# Patient Record
Sex: MALE | Race: WHITE | NOT HISPANIC OR LATINO | Employment: OTHER | URBAN - NONMETROPOLITAN AREA
[De-identification: names, ages, dates, MRNs, and addresses within clinical notes are randomized per-mention and may not be internally consistent; named-entity substitution may affect disease eponyms.]

---

## 2019-09-06 ENCOUNTER — HOSPITAL ENCOUNTER (EMERGENCY)
Facility: HOSPITAL | Age: 49
Discharge: HOME | End: 2019-09-06
Attending: EMERGENCY MEDICINE | Admitting: EMERGENCY MEDICINE

## 2019-09-06 ENCOUNTER — APPOINTMENT (OUTPATIENT)
Dept: CT IMAGING | Facility: HOSPITAL | Age: 49
End: 2019-09-06

## 2019-09-06 VITALS
TEMPERATURE: 98.1 F | OXYGEN SATURATION: 99 % | DIASTOLIC BLOOD PRESSURE: 73 MMHG | HEIGHT: 70 IN | SYSTOLIC BLOOD PRESSURE: 147 MMHG | HEART RATE: 55 BPM | WEIGHT: 175 LBS | RESPIRATION RATE: 18 BRPM | BODY MASS INDEX: 25.05 KG/M2

## 2019-09-06 DIAGNOSIS — S16.1XXA STRAIN OF NECK MUSCLE, INITIAL ENCOUNTER: Primary | ICD-10-CM

## 2019-09-06 PROCEDURE — 99283 EMERGENCY DEPT VISIT LOW MDM: CPT

## 2019-09-06 PROCEDURE — 72125 CT NECK SPINE W/O DYE: CPT | Performed by: RADIOLOGY

## 2019-09-06 PROCEDURE — 72125 CT NECK SPINE W/O DYE: CPT

## 2019-09-06 RX ORDER — CYCLOBENZAPRINE HCL 10 MG
10 TABLET ORAL 3 TIMES DAILY PRN
Qty: 12 TABLET | Refills: 0 | Status: ON HOLD | OUTPATIENT
Start: 2019-09-06 | End: 2023-02-12
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
## 2021-03-25 ENCOUNTER — HOSPITAL ENCOUNTER (EMERGENCY)
Facility: HOSPITAL | Age: 51
Discharge: LEFT BEFORE DISCHARGE | End: 2021-03-25

## 2021-03-25 VITALS
OXYGEN SATURATION: 96 % | TEMPERATURE: 98.7 F | RESPIRATION RATE: 18 BRPM | BODY MASS INDEX: 24.5 KG/M2 | HEIGHT: 71 IN | WEIGHT: 175 LBS | DIASTOLIC BLOOD PRESSURE: 73 MMHG | HEART RATE: 67 BPM | SYSTOLIC BLOOD PRESSURE: 126 MMHG

## 2021-03-25 PROCEDURE — 99211 OFF/OP EST MAY X REQ PHY/QHP: CPT

## 2021-03-25 NOTE — ED NOTES
"Pt came to triage desk at this time stating, \"I am feeling better and I am just going to go home\".  Pt ambulated out of ED with no assistance.     Ya Rodríguez RN  03/25/21 0448    "

## 2023-02-12 ENCOUNTER — HOSPITAL ENCOUNTER (EMERGENCY)
Facility: HOSPITAL | Age: 53
Discharge: PSYCHIATRIC OR SUBSTANCE USE FACILITY | End: 2023-02-12
Attending: STUDENT IN AN ORGANIZED HEALTH CARE EDUCATION/TRAINING PROGRAM | Admitting: STUDENT IN AN ORGANIZED HEALTH CARE EDUCATION/TRAINING PROGRAM
Payer: COMMERCIAL

## 2023-02-12 ENCOUNTER — HOSPITAL ENCOUNTER (INPATIENT)
Facility: HOSPITAL | Age: 53
LOS: 3 days | Discharge: HOSPICE | DRG: 897 | End: 2023-02-15
Attending: PSYCHIATRY & NEUROLOGY | Admitting: PSYCHIATRY & NEUROLOGY
Payer: COMMERCIAL

## 2023-02-12 VITALS
DIASTOLIC BLOOD PRESSURE: 76 MMHG | OXYGEN SATURATION: 97 % | TEMPERATURE: 97.3 F | SYSTOLIC BLOOD PRESSURE: 131 MMHG | WEIGHT: 172 LBS | HEART RATE: 70 BPM | HEIGHT: 70 IN | RESPIRATION RATE: 16 BRPM | BODY MASS INDEX: 24.62 KG/M2

## 2023-02-12 DIAGNOSIS — F32.A DEPRESSION, UNSPECIFIED DEPRESSION TYPE: ICD-10-CM

## 2023-02-12 DIAGNOSIS — F19.10 SUBSTANCE ABUSE: Primary | ICD-10-CM

## 2023-02-12 LAB
ALBUMIN SERPL-MCNC: 4 G/DL (ref 3.5–5.2)
ALBUMIN/GLOB SERPL: 1 G/DL
ALP SERPL-CCNC: 96 U/L (ref 39–117)
ALT SERPL W P-5'-P-CCNC: 35 U/L (ref 1–41)
AMPHET+METHAMPHET UR QL: NEGATIVE
AMPHETAMINES UR QL: NEGATIVE
ANION GAP SERPL CALCULATED.3IONS-SCNC: 8.1 MMOL/L (ref 5–15)
AST SERPL-CCNC: 30 U/L (ref 1–40)
BACTERIA UR QL AUTO: ABNORMAL /HPF
BARBITURATES UR QL SCN: NEGATIVE
BASOPHILS # BLD AUTO: 0.03 10*3/MM3 (ref 0–0.2)
BASOPHILS NFR BLD AUTO: 0.4 % (ref 0–1.5)
BENZODIAZ UR QL SCN: POSITIVE
BILIRUB SERPL-MCNC: 0.3 MG/DL (ref 0–1.2)
BILIRUB UR QL STRIP: NEGATIVE
BUN SERPL-MCNC: 11 MG/DL (ref 6–20)
BUN/CREAT SERPL: 12.1 (ref 7–25)
BUPRENORPHINE SERPL-MCNC: POSITIVE NG/ML
CALCIUM SPEC-SCNC: 9.5 MG/DL (ref 8.6–10.5)
CANNABINOIDS SERPL QL: NEGATIVE
CHLORIDE SERPL-SCNC: 105 MMOL/L (ref 98–107)
CLARITY UR: CLEAR
CO2 SERPL-SCNC: 26.9 MMOL/L (ref 22–29)
COCAINE UR QL: NEGATIVE
COLOR UR: YELLOW
CREAT SERPL-MCNC: 0.91 MG/DL (ref 0.76–1.27)
DEPRECATED RDW RBC AUTO: 42.6 FL (ref 37–54)
EGFRCR SERPLBLD CKD-EPI 2021: 101.4 ML/MIN/1.73
EOSINOPHIL # BLD AUTO: 0.1 10*3/MM3 (ref 0–0.4)
EOSINOPHIL NFR BLD AUTO: 1.3 % (ref 0.3–6.2)
ERYTHROCYTE [DISTWIDTH] IN BLOOD BY AUTOMATED COUNT: 13.1 % (ref 12.3–15.4)
ETHANOL BLD-MCNC: <10 MG/DL (ref 0–10)
ETHANOL UR QL: <0.01 %
FLUAV RNA RESP QL NAA+PROBE: NOT DETECTED
FLUBV RNA RESP QL NAA+PROBE: NOT DETECTED
GLOBULIN UR ELPH-MCNC: 4.2 GM/DL
GLUCOSE SERPL-MCNC: 133 MG/DL (ref 65–99)
GLUCOSE UR STRIP-MCNC: NEGATIVE MG/DL
HCT VFR BLD AUTO: 43.2 % (ref 37.5–51)
HGB BLD-MCNC: 14.2 G/DL (ref 13–17.7)
HGB UR QL STRIP.AUTO: ABNORMAL
HOLD SPECIMEN: NORMAL
HOLD SPECIMEN: NORMAL
HYALINE CASTS UR QL AUTO: ABNORMAL /LPF
IMM GRANULOCYTES # BLD AUTO: 0.01 10*3/MM3 (ref 0–0.05)
IMM GRANULOCYTES NFR BLD AUTO: 0.1 % (ref 0–0.5)
KETONES UR QL STRIP: NEGATIVE
LEUKOCYTE ESTERASE UR QL STRIP.AUTO: NEGATIVE
LYMPHOCYTES # BLD AUTO: 1.68 10*3/MM3 (ref 0.7–3.1)
LYMPHOCYTES NFR BLD AUTO: 22.6 % (ref 19.6–45.3)
MAGNESIUM SERPL-MCNC: 2.1 MG/DL (ref 1.6–2.6)
MCH RBC QN AUTO: 29.2 PG (ref 26.6–33)
MCHC RBC AUTO-ENTMCNC: 32.9 G/DL (ref 31.5–35.7)
MCV RBC AUTO: 88.9 FL (ref 79–97)
METHADONE UR QL SCN: NEGATIVE
MONOCYTES # BLD AUTO: 0.73 10*3/MM3 (ref 0.1–0.9)
MONOCYTES NFR BLD AUTO: 9.8 % (ref 5–12)
NEUTROPHILS NFR BLD AUTO: 4.87 10*3/MM3 (ref 1.7–7)
NEUTROPHILS NFR BLD AUTO: 65.8 % (ref 42.7–76)
NITRITE UR QL STRIP: NEGATIVE
NRBC BLD AUTO-RTO: 0 /100 WBC (ref 0–0.2)
OPIATES UR QL: NEGATIVE
OXYCODONE UR QL SCN: POSITIVE
PCP UR QL SCN: NEGATIVE
PH UR STRIP.AUTO: 6 [PH] (ref 5–8)
PLATELET # BLD AUTO: 262 10*3/MM3 (ref 140–450)
PMV BLD AUTO: 8.6 FL (ref 6–12)
POTASSIUM SERPL-SCNC: 3.8 MMOL/L (ref 3.5–5.2)
PROPOXYPH UR QL: NEGATIVE
PROT SERPL-MCNC: 8.2 G/DL (ref 6–8.5)
PROT UR QL STRIP: ABNORMAL
QT INTERVAL: 430 MS
QTC INTERVAL: 392 MS
RBC # BLD AUTO: 4.86 10*6/MM3 (ref 4.14–5.8)
RBC # UR STRIP: ABNORMAL /HPF
REF LAB TEST METHOD: ABNORMAL
SARS-COV-2 RNA RESP QL NAA+PROBE: NOT DETECTED
SODIUM SERPL-SCNC: 140 MMOL/L (ref 136–145)
SP GR UR STRIP: 1.02 (ref 1–1.03)
SQUAMOUS #/AREA URNS HPF: ABNORMAL /HPF
TRICYCLICS UR QL SCN: NEGATIVE
UROBILINOGEN UR QL STRIP: ABNORMAL
WBC # UR STRIP: ABNORMAL /HPF
WBC NRBC COR # BLD: 7.42 10*3/MM3 (ref 3.4–10.8)
WHOLE BLOOD HOLD COAG: NORMAL
WHOLE BLOOD HOLD SPECIMEN: NORMAL

## 2023-02-12 PROCEDURE — 99285 EMERGENCY DEPT VISIT HI MDM: CPT

## 2023-02-12 PROCEDURE — 83735 ASSAY OF MAGNESIUM: CPT | Performed by: PHYSICIAN ASSISTANT

## 2023-02-12 PROCEDURE — 87636 SARSCOV2 & INF A&B AMP PRB: CPT | Performed by: PHYSICIAN ASSISTANT

## 2023-02-12 PROCEDURE — 93005 ELECTROCARDIOGRAM TRACING: CPT | Performed by: PSYCHIATRY & NEUROLOGY

## 2023-02-12 PROCEDURE — 93010 ELECTROCARDIOGRAM REPORT: CPT | Performed by: SPECIALIST

## 2023-02-12 PROCEDURE — 36415 COLL VENOUS BLD VENIPUNCTURE: CPT

## 2023-02-12 PROCEDURE — 80306 DRUG TEST PRSMV INSTRMNT: CPT | Performed by: PHYSICIAN ASSISTANT

## 2023-02-12 PROCEDURE — 80053 COMPREHEN METABOLIC PANEL: CPT | Performed by: PHYSICIAN ASSISTANT

## 2023-02-12 PROCEDURE — 81001 URINALYSIS AUTO W/SCOPE: CPT | Performed by: PHYSICIAN ASSISTANT

## 2023-02-12 PROCEDURE — 85025 COMPLETE CBC W/AUTO DIFF WBC: CPT | Performed by: PHYSICIAN ASSISTANT

## 2023-02-12 PROCEDURE — 82077 ASSAY SPEC XCP UR&BREATH IA: CPT | Performed by: PHYSICIAN ASSISTANT

## 2023-02-12 RX ORDER — CLONIDINE HYDROCHLORIDE 0.1 MG/1
0.1 TABLET ORAL 4 TIMES DAILY PRN
Status: ACTIVE | OUTPATIENT
Start: 2023-02-12 | End: 2023-02-13

## 2023-02-12 RX ORDER — TRAZODONE HYDROCHLORIDE 50 MG/1
50 TABLET ORAL NIGHTLY PRN
Status: DISCONTINUED | OUTPATIENT
Start: 2023-02-12 | End: 2023-02-15 | Stop reason: HOSPADM

## 2023-02-12 RX ORDER — FAMOTIDINE 20 MG/1
20 TABLET, FILM COATED ORAL 2 TIMES DAILY PRN
Status: DISCONTINUED | OUTPATIENT
Start: 2023-02-12 | End: 2023-02-15 | Stop reason: HOSPADM

## 2023-02-12 RX ORDER — HYDRALAZINE HYDROCHLORIDE 25 MG/1
25 TABLET, FILM COATED ORAL DAILY PRN
Status: DISCONTINUED | OUTPATIENT
Start: 2023-02-12 | End: 2023-02-15 | Stop reason: HOSPADM

## 2023-02-12 RX ORDER — HYDROXYZINE HYDROCHLORIDE 25 MG/1
50 TABLET, FILM COATED ORAL EVERY 6 HOURS PRN
Status: DISCONTINUED | OUTPATIENT
Start: 2023-02-12 | End: 2023-02-15 | Stop reason: HOSPADM

## 2023-02-12 RX ORDER — CLONIDINE HYDROCHLORIDE 0.1 MG/1
0.1 TABLET ORAL 2 TIMES DAILY PRN
Status: DISCONTINUED | OUTPATIENT
Start: 2023-02-15 | End: 2023-02-15 | Stop reason: HOSPADM

## 2023-02-12 RX ORDER — LOPERAMIDE HYDROCHLORIDE 2 MG/1
2 CAPSULE ORAL
Status: DISCONTINUED | OUTPATIENT
Start: 2023-02-12 | End: 2023-02-15 | Stop reason: HOSPADM

## 2023-02-12 RX ORDER — LORAZEPAM 2 MG/1
2 TABLET ORAL
Status: COMPLETED | OUTPATIENT
Start: 2023-02-13 | End: 2023-02-13

## 2023-02-12 RX ORDER — LORAZEPAM 0.5 MG/1
0.5 TABLET ORAL EVERY 4 HOURS PRN
Status: DISCONTINUED | OUTPATIENT
Start: 2023-02-16 | End: 2023-02-15 | Stop reason: HOSPADM

## 2023-02-12 RX ORDER — ALUMINA, MAGNESIA, AND SIMETHICONE 2400; 2400; 240 MG/30ML; MG/30ML; MG/30ML
15 SUSPENSION ORAL EVERY 6 HOURS PRN
Status: DISCONTINUED | OUTPATIENT
Start: 2023-02-12 | End: 2023-02-15 | Stop reason: HOSPADM

## 2023-02-12 RX ORDER — LORAZEPAM 0.5 MG/1
1.5 TABLET ORAL
Status: COMPLETED | OUTPATIENT
Start: 2023-02-14 | End: 2023-02-14

## 2023-02-12 RX ORDER — CLONIDINE HYDROCHLORIDE 0.1 MG/1
0.1 TABLET ORAL ONCE AS NEEDED
Status: DISCONTINUED | OUTPATIENT
Start: 2023-02-16 | End: 2023-02-15 | Stop reason: HOSPADM

## 2023-02-12 RX ORDER — CYCLOBENZAPRINE HCL 10 MG
10 TABLET ORAL 3 TIMES DAILY PRN
Status: DISCONTINUED | OUTPATIENT
Start: 2023-02-12 | End: 2023-02-15 | Stop reason: HOSPADM

## 2023-02-12 RX ORDER — ACETAMINOPHEN 325 MG/1
650 TABLET ORAL EVERY 6 HOURS PRN
Status: DISCONTINUED | OUTPATIENT
Start: 2023-02-12 | End: 2023-02-15 | Stop reason: HOSPADM

## 2023-02-12 RX ORDER — LORAZEPAM 2 MG/1
2 TABLET ORAL EVERY 4 HOURS PRN
Status: ACTIVE | OUTPATIENT
Start: 2023-02-13 | End: 2023-02-14

## 2023-02-12 RX ORDER — CLONIDINE HYDROCHLORIDE 0.1 MG/1
0.1 TABLET ORAL 4 TIMES DAILY PRN
Status: DISPENSED | OUTPATIENT
Start: 2023-02-13 | End: 2023-02-14

## 2023-02-12 RX ORDER — ONDANSETRON 4 MG/1
4 TABLET, FILM COATED ORAL EVERY 6 HOURS PRN
Status: DISCONTINUED | OUTPATIENT
Start: 2023-02-12 | End: 2023-02-15 | Stop reason: HOSPADM

## 2023-02-12 RX ORDER — LORAZEPAM 0.5 MG/1
0.5 TABLET ORAL
Status: DISCONTINUED | OUTPATIENT
Start: 2023-02-16 | End: 2023-02-15 | Stop reason: HOSPADM

## 2023-02-12 RX ORDER — ECHINACEA PURPUREA EXTRACT 125 MG
2 TABLET ORAL AS NEEDED
Status: DISCONTINUED | OUTPATIENT
Start: 2023-02-12 | End: 2023-02-15 | Stop reason: HOSPADM

## 2023-02-12 RX ORDER — BENZONATATE 100 MG/1
100 CAPSULE ORAL 3 TIMES DAILY PRN
Status: DISCONTINUED | OUTPATIENT
Start: 2023-02-12 | End: 2023-02-15 | Stop reason: HOSPADM

## 2023-02-12 RX ORDER — LORAZEPAM 1 MG/1
1 TABLET ORAL EVERY 4 HOURS PRN
Status: DISCONTINUED | OUTPATIENT
Start: 2023-02-15 | End: 2023-02-15 | Stop reason: HOSPADM

## 2023-02-12 RX ORDER — LORAZEPAM 2 MG/1
2 TABLET ORAL
Status: DISPENSED | OUTPATIENT
Start: 2023-02-12 | End: 2023-02-13

## 2023-02-12 RX ORDER — BENZTROPINE MESYLATE 1 MG/ML
1 INJECTION INTRAMUSCULAR; INTRAVENOUS ONCE AS NEEDED
Status: DISCONTINUED | OUTPATIENT
Start: 2023-02-12 | End: 2023-02-15 | Stop reason: HOSPADM

## 2023-02-12 RX ORDER — LORAZEPAM 1 MG/1
1 TABLET ORAL
Status: DISCONTINUED | OUTPATIENT
Start: 2023-02-15 | End: 2023-02-15 | Stop reason: HOSPADM

## 2023-02-12 RX ORDER — CLONIDINE HYDROCHLORIDE 0.1 MG/1
0.1 TABLET ORAL 3 TIMES DAILY PRN
Status: ACTIVE | OUTPATIENT
Start: 2023-02-14 | End: 2023-02-15

## 2023-02-12 RX ORDER — IBUPROFEN 400 MG/1
400 TABLET ORAL EVERY 6 HOURS PRN
Status: DISCONTINUED | OUTPATIENT
Start: 2023-02-12 | End: 2023-02-15 | Stop reason: HOSPADM

## 2023-02-12 RX ORDER — BENZTROPINE MESYLATE 1 MG/1
2 TABLET ORAL ONCE AS NEEDED
Status: DISCONTINUED | OUTPATIENT
Start: 2023-02-12 | End: 2023-02-15 | Stop reason: HOSPADM

## 2023-02-12 RX ORDER — DICYCLOMINE HYDROCHLORIDE 10 MG/1
10 CAPSULE ORAL 3 TIMES DAILY PRN
Status: DISCONTINUED | OUTPATIENT
Start: 2023-02-12 | End: 2023-02-15 | Stop reason: HOSPADM

## 2023-02-12 RX ADMIN — HYDROXYZINE HYDROCHLORIDE 50 MG: 25 TABLET ORAL at 21:26

## 2023-02-12 RX ADMIN — LORAZEPAM 2 MG: 2 TABLET ORAL at 13:42

## 2023-02-12 RX ADMIN — TRAZODONE HYDROCHLORIDE 50 MG: 50 TABLET ORAL at 21:26

## 2023-02-12 RX ADMIN — CYCLOBENZAPRINE 10 MG: 10 TABLET, FILM COATED ORAL at 21:26

## 2023-02-12 RX ADMIN — HYDROXYZINE HYDROCHLORIDE 50 MG: 25 TABLET ORAL at 13:42

## 2023-02-12 RX ADMIN — CYCLOBENZAPRINE 10 MG: 10 TABLET, FILM COATED ORAL at 13:42

## 2023-02-12 RX ADMIN — IBUPROFEN 400 MG: 400 TABLET, FILM COATED ORAL at 21:26
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
## 2023-02-12 NOTE — ED PROVIDER NOTES
Subjective   History of Present Illness  52-year-old male presents to the ED today for a detox evaluation.  He reports that he needs detox from Xanax and Percocet.  He states he takes 10 to 15 pills of each daily.  He states he has been taking pieces of Suboxone to try to help with his withdrawal symptoms but it has not been helping.  He states he has not been able to sleep and that is making his symptoms worse.  He states he does start to have nausea and vomiting if he does not take the Suboxone.  He states he did start to have suicidal ideations last night.  He states that he does not have a plan.  He states he just does not feel like living any longer.    History provided by:  Patient  Drug / Alcohol Assessment  Primary symptoms comment: requesting detox. This is a new problem. The current episode started 12 to 24 hours ago. The problem has been gradually worsening. Suspected agents include prescription drugs and opiates. Associated symptoms include nausea and vomiting. Associated medical issues include addiction treatment.       Review of Systems   Constitutional: Negative.    HENT: Negative.    Eyes: Negative.    Respiratory: Negative.    Cardiovascular: Negative.    Gastrointestinal: Positive for nausea and vomiting.   Genitourinary: Negative.    Musculoskeletal: Negative.    Skin: Negative.    Neurological: Negative.    Psychiatric/Behavioral: Positive for dysphoric mood, sleep disturbance and suicidal ideas.   All other systems reviewed and are negative.      Past Medical History:   Diagnosis Date   • Substance abuse (HCC)        Allergies   Allergen Reactions   • Penicillins GI Intolerance       Past Surgical History:   Procedure Laterality Date   • NO PAST SURGERIES         Family History   Problem Relation Age of Onset   • Drug abuse Father        Social History     Socioeconomic History   • Marital status:    • Number of children: 2   • Years of education: 12   • Highest education level: 12th  grade   Tobacco Use   • Smoking status: Former     Types: Cigarettes     Passive exposure: Never   • Smokeless tobacco: Never   • Tobacco comments:     Quit smoking 5-10 years ago   Vaping Use   • Vaping Use: Every day   • Substances: Nicotine, Flavoring   • Devices: Disposable, Pre-filled or refillable cartridge, Pre-filled pod   • Passive vaping exposure: Yes   Substance and Sexual Activity   • Alcohol use: Not Currently     Comment: denies   • Drug use: Yes     Types: Benzodiazepines     Comment: opiates   • Sexual activity: Defer           Objective   Physical Exam  Vitals and nursing note reviewed.   Constitutional:       General: He is not in acute distress.     Appearance: Normal appearance.   HENT:      Head: Normocephalic and atraumatic.      Right Ear: External ear normal.      Left Ear: External ear normal.   Eyes:      Conjunctiva/sclera: Conjunctivae normal.      Pupils: Pupils are equal, round, and reactive to light.   Cardiovascular:      Rate and Rhythm: Normal rate and regular rhythm.      Pulses: Normal pulses.      Heart sounds: Normal heart sounds.   Pulmonary:      Effort: Pulmonary effort is normal.      Breath sounds: Normal breath sounds.   Abdominal:      General: Bowel sounds are normal.      Palpations: Abdomen is soft.   Musculoskeletal:         General: Normal range of motion.      Cervical back: Normal range of motion and neck supple.   Skin:     General: Skin is warm and dry.      Capillary Refill: Capillary refill takes less than 2 seconds.   Neurological:      General: No focal deficit present.      Mental Status: He is alert and oriented to person, place, and time.   Psychiatric:         Mood and Affect: Mood is anxious.         Speech: Speech normal.         Behavior: Behavior normal. Behavior is cooperative.         Thought Content: Thought content does not include homicidal or suicidal (no current SI) ideation.         Procedures        Results for orders placed or performed  during the hospital encounter of 02/12/23   COVID-19 and FLU A/B PCR - Swab, Nasopharynx    Specimen: Nasopharynx; Swab   Result Value Ref Range    COVID19 Not Detected Not Detected - Ref. Range    Influenza A PCR Not Detected Not Detected    Influenza B PCR Not Detected Not Detected   Comprehensive Metabolic Panel    Specimen: Blood   Result Value Ref Range    Glucose 133 (H) 65 - 99 mg/dL    BUN 11 6 - 20 mg/dL    Creatinine 0.91 0.76 - 1.27 mg/dL    Sodium 140 136 - 145 mmol/L    Potassium 3.8 3.5 - 5.2 mmol/L    Chloride 105 98 - 107 mmol/L    CO2 26.9 22.0 - 29.0 mmol/L    Calcium 9.5 8.6 - 10.5 mg/dL    Total Protein 8.2 6.0 - 8.5 g/dL    Albumin 4.0 3.5 - 5.2 g/dL    ALT (SGPT) 35 1 - 41 U/L    AST (SGOT) 30 1 - 40 U/L    Alkaline Phosphatase 96 39 - 117 U/L    Total Bilirubin 0.3 0.0 - 1.2 mg/dL    Globulin 4.2 gm/dL    A/G Ratio 1.0 g/dL    BUN/Creatinine Ratio 12.1 7.0 - 25.0    Anion Gap 8.1 5.0 - 15.0 mmol/L    eGFR 101.4 >60.0 mL/min/1.73   Urinalysis With Microscopic If Indicated (No Culture) - Urine, Clean Catch    Specimen: Urine, Clean Catch   Result Value Ref Range    Color, UA Yellow Yellow, Straw    Appearance, UA Clear Clear    pH, UA 6.0 5.0 - 8.0    Specific Gravity, UA 1.025 1.005 - 1.030    Glucose, UA Negative Negative    Ketones, UA Negative Negative    Bilirubin, UA Negative Negative    Blood, UA Trace (A) Negative    Protein, UA Trace (A) Negative    Leuk Esterase, UA Negative Negative    Nitrite, UA Negative Negative    Urobilinogen, UA 1.0 E.U./dL 0.2 - 1.0 E.U./dL   Ethanol    Specimen: Blood   Result Value Ref Range    Ethanol <10 0 - 10 mg/dL    Ethanol % <0.010 %   Urine Drug Screen - Urine, Clean Catch    Specimen: Urine, Clean Catch   Result Value Ref Range    THC, Screen, Urine Negative Negative    Phencyclidine (PCP), Urine Negative Negative    Cocaine Screen, Urine Negative Negative    Methamphetamine, Ur Negative Negative    Opiate Screen Negative Negative    Amphetamine  Screen, Urine Negative Negative    Benzodiazepine Screen, Urine Positive (A) Negative    Tricyclic Antidepressants Screen Negative Negative    Methadone Screen, Urine Negative Negative    Barbiturates Screen, Urine Negative Negative    Oxycodone Screen, Urine Positive (A) Negative    Propoxyphene Screen Negative Negative    Buprenorphine, Screen, Urine Positive (A) Negative   Magnesium    Specimen: Blood   Result Value Ref Range    Magnesium 2.1 1.6 - 2.6 mg/dL   CBC Auto Differential    Specimen: Blood   Result Value Ref Range    WBC 7.42 3.40 - 10.80 10*3/mm3    RBC 4.86 4.14 - 5.80 10*6/mm3    Hemoglobin 14.2 13.0 - 17.7 g/dL    Hematocrit 43.2 37.5 - 51.0 %    MCV 88.9 79.0 - 97.0 fL    MCH 29.2 26.6 - 33.0 pg    MCHC 32.9 31.5 - 35.7 g/dL    RDW 13.1 12.3 - 15.4 %    RDW-SD 42.6 37.0 - 54.0 fl    MPV 8.6 6.0 - 12.0 fL    Platelets 262 140 - 450 10*3/mm3    Neutrophil % 65.8 42.7 - 76.0 %    Lymphocyte % 22.6 19.6 - 45.3 %    Monocyte % 9.8 5.0 - 12.0 %    Eosinophil % 1.3 0.3 - 6.2 %    Basophil % 0.4 0.0 - 1.5 %    Immature Grans % 0.1 0.0 - 0.5 %    Neutrophils, Absolute 4.87 1.70 - 7.00 10*3/mm3    Lymphocytes, Absolute 1.68 0.70 - 3.10 10*3/mm3    Monocytes, Absolute 0.73 0.10 - 0.90 10*3/mm3    Eosinophils, Absolute 0.10 0.00 - 0.40 10*3/mm3    Basophils, Absolute 0.03 0.00 - 0.20 10*3/mm3    Immature Grans, Absolute 0.01 0.00 - 0.05 10*3/mm3    nRBC 0.0 0.0 - 0.2 /100 WBC   Urinalysis, Microscopic Only - Urine, Clean Catch    Specimen: Urine, Clean Catch   Result Value Ref Range    RBC, UA 3-5 (A) None Seen, 0-2 /HPF    WBC, UA 0-2 None Seen, 0-2 /HPF    Bacteria, UA None Seen None Seen /HPF    Squamous Epithelial Cells, UA 0-2 None Seen, 0-2 /HPF    Hyaline Casts, UA None Seen None Seen /LPF    Methodology Automated Microscopy    Green Top (Gel)   Result Value Ref Range    Extra Tube Hold for add-ons.    Lavender Top   Result Value Ref Range    Extra Tube hold for add-on    Gold Top - SST   Result Value  Ref Range    Extra Tube Hold for add-ons.    Light Blue Top   Result Value Ref Range    Extra Tube Hold for add-ons.          ED Course  ED Course as of 02/12/23 1357   Sun Feb 12, 2023   1221 Medically clear for psych [AH]      ED Course User Index  [AH] Marcelle Mello PA                                           Medical Decision Making  52-year-old male who presents to the ED today for detox evaluation.  He reports he needs detox from Xanax and Percocet.  He states he is having active withdrawal symptoms.  He has had intermittent suicidal ideations but denies them at this time.  He was medically clear for a psychiatric evaluation.  Psychiatry was consulted who advised on inpatient admission.    Amount and/or Complexity of Data Reviewed  Labs: ordered.      Risk  Decision regarding hospitalization.          Final diagnoses:   Substance abuse (HCC)   Depression, unspecified depression type       ED Disposition  ED Disposition     ED Disposition   DC/Transfer to Behavioral Health    Condition   Stable    Comment   --             No follow-up provider specified.       Medication List      No changes were made to your prescriptions during this visit.          Marcelle Mello PA  02/12/23 1232

## 2023-02-13 PROBLEM — F13.20 SEVERE BENZODIAZEPINE USE DISORDER (HCC): Status: ACTIVE | Noted: 2023-02-12

## 2023-02-13 PROBLEM — F11.20 OPIOID USE DISORDER, SEVERE, DEPENDENCE (HCC): Status: ACTIVE | Noted: 2023-02-13

## 2023-02-13 PROCEDURE — 99223 1ST HOSP IP/OBS HIGH 75: CPT | Performed by: PSYCHIATRY & NEUROLOGY

## 2023-02-13 RX ORDER — BUPRENORPHINE 2 MG/1
2 TABLET SUBLINGUAL 3 TIMES DAILY
Status: DISPENSED | OUTPATIENT
Start: 2023-02-13 | End: 2023-02-14

## 2023-02-13 RX ORDER — BUPRENORPHINE 2 MG/1
2 TABLET SUBLINGUAL 2 TIMES DAILY
Status: COMPLETED | OUTPATIENT
Start: 2023-02-14 | End: 2023-02-14

## 2023-02-13 RX ORDER — BUPRENORPHINE 2 MG/1
2 TABLET SUBLINGUAL DAILY
Status: COMPLETED | OUTPATIENT
Start: 2023-02-15 | End: 2023-02-15

## 2023-02-13 RX ADMIN — CYCLOBENZAPRINE 10 MG: 10 TABLET, FILM COATED ORAL at 08:25

## 2023-02-13 RX ADMIN — BUPRENORPHINE HCL 2 MG: 2 TABLET SUBLINGUAL at 10:13

## 2023-02-13 RX ADMIN — CLONIDINE HYDROCHLORIDE 0.1 MG: 0.1 TABLET ORAL at 23:59

## 2023-02-13 RX ADMIN — LORAZEPAM 2 MG: 2 TABLET ORAL at 14:10

## 2023-02-13 RX ADMIN — LORAZEPAM 2 MG: 2 TABLET ORAL at 21:13

## 2023-02-13 RX ADMIN — TRAZODONE HYDROCHLORIDE 50 MG: 50 TABLET ORAL at 23:59

## 2023-02-13 RX ADMIN — LORAZEPAM 2 MG: 2 TABLET ORAL at 08:25

## 2023-02-13 RX ADMIN — HYDROXYZINE HYDROCHLORIDE 50 MG: 25 TABLET ORAL at 08:25

## 2023-02-13 RX ADMIN — IBUPROFEN 400 MG: 400 TABLET, FILM COATED ORAL at 16:47

## 2023-02-14 PROBLEM — F13.931 BENZODIAZEPINE WITHDRAWAL WITH DELIRIUM (HCC): Status: ACTIVE | Noted: 2023-02-14

## 2023-02-14 PROCEDURE — 99232 SBSQ HOSP IP/OBS MODERATE 35: CPT | Performed by: PSYCHIATRY & NEUROLOGY

## 2023-02-14 RX ORDER — OLANZAPINE 5 MG/1
5 TABLET, ORALLY DISINTEGRATING ORAL 2 TIMES DAILY
Status: DISCONTINUED | OUTPATIENT
Start: 2023-02-14 | End: 2023-02-15

## 2023-02-14 RX ADMIN — LORAZEPAM 1.5 MG: 0.5 TABLET ORAL at 14:08

## 2023-02-14 RX ADMIN — OLANZAPINE 5 MG: 5 TABLET, ORALLY DISINTEGRATING ORAL at 13:12

## 2023-02-14 RX ADMIN — LORAZEPAM 1.5 MG: 0.5 TABLET ORAL at 21:36

## 2023-02-14 RX ADMIN — BUPRENORPHINE HCL 2 MG: 2 TABLET SUBLINGUAL at 08:27

## 2023-02-14 RX ADMIN — HYDROXYZINE HYDROCHLORIDE 50 MG: 25 TABLET ORAL at 13:12

## 2023-02-14 RX ADMIN — LORAZEPAM 1.5 MG: 0.5 TABLET ORAL at 08:27

## 2023-02-14 RX ADMIN — BUPRENORPHINE HCL 2 MG: 2 TABLET SUBLINGUAL at 21:36

## 2023-02-14 RX ADMIN — OLANZAPINE 5 MG: 5 TABLET, ORALLY DISINTEGRATING ORAL at 21:36

## 2023-02-14 RX ADMIN — HYDROXYZINE HYDROCHLORIDE 50 MG: 25 TABLET ORAL at 05:00

## 2023-02-15 ENCOUNTER — HOSPITAL ENCOUNTER (INPATIENT)
Facility: HOSPITAL | Age: 53
LOS: 2 days | Discharge: LEFT BEFORE DISCHARGE | DRG: 894 | End: 2023-02-17
Attending: INTERNAL MEDICINE | Admitting: INTERNAL MEDICINE
Payer: COMMERCIAL

## 2023-02-15 VITALS
WEIGHT: 178 LBS | SYSTOLIC BLOOD PRESSURE: 140 MMHG | BODY MASS INDEX: 25.48 KG/M2 | OXYGEN SATURATION: 97 % | RESPIRATION RATE: 18 BRPM | HEIGHT: 70 IN | DIASTOLIC BLOOD PRESSURE: 79 MMHG | HEART RATE: 79 BPM | TEMPERATURE: 97.9 F

## 2023-02-15 PROBLEM — F10.939 ALCOHOL WITHDRAWAL: Status: ACTIVE | Noted: 2023-02-15

## 2023-02-15 PROCEDURE — 25010000002 DIPHENHYDRAMINE PER 50 MG: Performed by: PSYCHIATRY & NEUROLOGY

## 2023-02-15 PROCEDURE — 99239 HOSP IP/OBS DSCHRG MGMT >30: CPT | Performed by: PSYCHIATRY & NEUROLOGY

## 2023-02-15 PROCEDURE — 25010000002 LORAZEPAM PER 2 MG: Performed by: PSYCHIATRY & NEUROLOGY

## 2023-02-15 PROCEDURE — 25010000002 THIAMINE PER 100 MG: Performed by: INTERNAL MEDICINE

## 2023-02-15 PROCEDURE — 25010000002 HALOPERIDOL LACTATE PER 5 MG: Performed by: PSYCHIATRY & NEUROLOGY

## 2023-02-15 PROCEDURE — 99223 1ST HOSP IP/OBS HIGH 75: CPT | Performed by: INTERNAL MEDICINE

## 2023-02-15 PROCEDURE — 25010000002 ENOXAPARIN PER 10 MG: Performed by: INTERNAL MEDICINE

## 2023-02-15 RX ORDER — DIPHENOXYLATE HYDROCHLORIDE AND ATROPINE SULFATE 2.5; .025 MG/1; MG/1
1 TABLET ORAL DAILY
Status: DISCONTINUED | OUTPATIENT
Start: 2023-02-16 | End: 2023-02-17 | Stop reason: HOSPADM

## 2023-02-15 RX ORDER — LORAZEPAM 1 MG/1
1 TABLET ORAL
Status: DISCONTINUED | OUTPATIENT
Start: 2023-02-15 | End: 2023-02-17 | Stop reason: HOSPADM

## 2023-02-15 RX ORDER — THIAMINE HYDROCHLORIDE 100 MG/ML
100 INJECTION, SOLUTION INTRAMUSCULAR; INTRAVENOUS ONCE
Status: COMPLETED | OUTPATIENT
Start: 2023-02-15 | End: 2023-02-15

## 2023-02-15 RX ORDER — ACETAMINOPHEN 160 MG/5ML
650 SOLUTION ORAL EVERY 4 HOURS PRN
Status: DISCONTINUED | OUTPATIENT
Start: 2023-02-15 | End: 2023-02-17 | Stop reason: HOSPADM

## 2023-02-15 RX ORDER — ACETAMINOPHEN 325 MG/1
650 TABLET ORAL EVERY 4 HOURS PRN
Status: DISCONTINUED | OUTPATIENT
Start: 2023-02-15 | End: 2023-02-17 | Stop reason: HOSPADM

## 2023-02-15 RX ORDER — CLONIDINE HYDROCHLORIDE 0.1 MG/1
0.1 TABLET ORAL EVERY 4 HOURS PRN
Status: DISCONTINUED | OUTPATIENT
Start: 2023-02-15 | End: 2023-02-17 | Stop reason: HOSPADM

## 2023-02-15 RX ORDER — LORAZEPAM 2 MG/1
2 TABLET ORAL
Status: DISCONTINUED | OUTPATIENT
Start: 2023-02-15 | End: 2023-02-15 | Stop reason: HOSPADM

## 2023-02-15 RX ORDER — ENOXAPARIN SODIUM 100 MG/ML
40 INJECTION SUBCUTANEOUS NIGHTLY
Status: DISCONTINUED | OUTPATIENT
Start: 2023-02-15 | End: 2023-02-17 | Stop reason: HOSPADM

## 2023-02-15 RX ORDER — SODIUM CHLORIDE 0.9 % (FLUSH) 0.9 %
10 SYRINGE (ML) INJECTION EVERY 12 HOURS SCHEDULED
Status: DISCONTINUED | OUTPATIENT
Start: 2023-02-15 | End: 2023-02-17 | Stop reason: HOSPADM

## 2023-02-15 RX ORDER — METHION/INOS/CHOL BT/B COM/LIV 110MG-86MG
100 CAPSULE ORAL DAILY
Status: DISCONTINUED | OUTPATIENT
Start: 2023-02-16 | End: 2023-02-17 | Stop reason: HOSPADM

## 2023-02-15 RX ORDER — NITROGLYCERIN 0.4 MG/1
0.4 TABLET SUBLINGUAL
Status: DISCONTINUED | OUTPATIENT
Start: 2023-02-15 | End: 2023-02-17 | Stop reason: HOSPADM

## 2023-02-15 RX ORDER — LORAZEPAM 2 MG/1
2 TABLET ORAL
Status: DISCONTINUED | OUTPATIENT
Start: 2023-02-15 | End: 2023-02-17 | Stop reason: HOSPADM

## 2023-02-15 RX ORDER — DIPHENHYDRAMINE HYDROCHLORIDE 50 MG/ML
50 INJECTION INTRAMUSCULAR; INTRAVENOUS ONCE
Status: COMPLETED | OUTPATIENT
Start: 2023-02-15 | End: 2023-02-15

## 2023-02-15 RX ORDER — LORAZEPAM 2 MG/ML
2 INJECTION INTRAMUSCULAR
Status: DISCONTINUED | OUTPATIENT
Start: 2023-02-15 | End: 2023-02-17 | Stop reason: HOSPADM

## 2023-02-15 RX ORDER — FOLIC ACID 1 MG/1
1 TABLET ORAL DAILY
Status: DISCONTINUED | OUTPATIENT
Start: 2023-02-16 | End: 2023-02-17 | Stop reason: HOSPADM

## 2023-02-15 RX ORDER — ACETAMINOPHEN 650 MG/1
650 SUPPOSITORY RECTAL EVERY 4 HOURS PRN
Status: DISCONTINUED | OUTPATIENT
Start: 2023-02-15 | End: 2023-02-17 | Stop reason: HOSPADM

## 2023-02-15 RX ORDER — SODIUM CHLORIDE 0.9 % (FLUSH) 0.9 %
10 SYRINGE (ML) INJECTION AS NEEDED
Status: DISCONTINUED | OUTPATIENT
Start: 2023-02-15 | End: 2023-02-17 | Stop reason: HOSPADM

## 2023-02-15 RX ORDER — SODIUM CHLORIDE 9 MG/ML
40 INJECTION, SOLUTION INTRAVENOUS AS NEEDED
Status: DISCONTINUED | OUTPATIENT
Start: 2023-02-15 | End: 2023-02-17 | Stop reason: HOSPADM

## 2023-02-15 RX ORDER — LORAZEPAM 2 MG/ML
2 INJECTION INTRAMUSCULAR ONCE
Status: COMPLETED | OUTPATIENT
Start: 2023-02-15 | End: 2023-02-15

## 2023-02-15 RX ORDER — HALOPERIDOL 5 MG/ML
5 INJECTION INTRAMUSCULAR ONCE
Status: COMPLETED | OUTPATIENT
Start: 2023-02-15 | End: 2023-02-15

## 2023-02-15 RX ORDER — OLANZAPINE 5 MG/1
10 TABLET, ORALLY DISINTEGRATING ORAL 2 TIMES DAILY
Status: DISCONTINUED | OUTPATIENT
Start: 2023-02-15 | End: 2023-02-15 | Stop reason: HOSPADM

## 2023-02-15 RX ORDER — LORAZEPAM 2 MG/ML
1 INJECTION INTRAMUSCULAR
Status: DISCONTINUED | OUTPATIENT
Start: 2023-02-15 | End: 2023-02-17 | Stop reason: HOSPADM

## 2023-02-15 RX ORDER — METHION/INOS/CHOL BT/B COM/LIV 110MG-86MG
100 CAPSULE ORAL ONCE
Status: COMPLETED | OUTPATIENT
Start: 2023-02-15 | End: 2023-02-15

## 2023-02-15 RX ADMIN — LORAZEPAM 2 MG: 2 TABLET ORAL at 16:14

## 2023-02-15 RX ADMIN — LORAZEPAM 1 MG: 1 TABLET ORAL at 14:11

## 2023-02-15 RX ADMIN — DIPHENHYDRAMINE HYDROCHLORIDE 50 MG: 50 INJECTION INTRAMUSCULAR; INTRAVENOUS at 06:10

## 2023-02-15 RX ADMIN — HYDROXYZINE HYDROCHLORIDE 50 MG: 25 TABLET ORAL at 09:12

## 2023-02-15 RX ADMIN — LORAZEPAM 1 MG: 1 TABLET ORAL at 08:23

## 2023-02-15 RX ADMIN — DEXMEDETOMIDINE 0.2 MCG/KG/HR: 200 INJECTION, SOLUTION INTRAVENOUS at 18:29

## 2023-02-15 RX ADMIN — LORAZEPAM 2 MG: 2 TABLET ORAL at 13:44

## 2023-02-15 RX ADMIN — LORAZEPAM 2 MG: 2 TABLET ORAL at 15:43

## 2023-02-15 RX ADMIN — LORAZEPAM 1.5 MG: 1 TABLET ORAL at 01:11

## 2023-02-15 RX ADMIN — LORAZEPAM 2 MG: 2 TABLET ORAL at 11:25

## 2023-02-15 RX ADMIN — LORAZEPAM 2 MG: 2 INJECTION INTRAMUSCULAR; INTRAVENOUS at 06:10

## 2023-02-15 RX ADMIN — LORAZEPAM 2 MG: 2 TABLET ORAL at 15:06

## 2023-02-15 RX ADMIN — LORAZEPAM 2 MG: 2 TABLET ORAL at 12:35

## 2023-02-15 RX ADMIN — LORAZEPAM 2 MG: 2 TABLET ORAL at 10:38

## 2023-02-15 RX ADMIN — LORAZEPAM 2 MG: 2 TABLET ORAL at 09:12

## 2023-02-15 RX ADMIN — THIAMINE HYDROCHLORIDE 100 MG: 200 INJECTION, SOLUTION INTRAMUSCULAR; INTRAVENOUS at 20:35

## 2023-02-15 RX ADMIN — Medication 10 ML: at 20:35

## 2023-02-15 RX ADMIN — LORAZEPAM 2 MG: 2 TABLET ORAL at 14:31

## 2023-02-15 RX ADMIN — LORAZEPAM 2 MG: 2 TABLET ORAL at 13:09

## 2023-02-15 RX ADMIN — HYDROXYZINE HYDROCHLORIDE 50 MG: 25 TABLET ORAL at 00:01

## 2023-02-15 RX ADMIN — TRAZODONE HYDROCHLORIDE 50 MG: 50 TABLET ORAL at 00:01

## 2023-02-15 RX ADMIN — OLANZAPINE 5 MG: 5 TABLET, ORALLY DISINTEGRATING ORAL at 08:22

## 2023-02-15 RX ADMIN — LORAZEPAM 1.5 MG: 1 TABLET ORAL at 05:28

## 2023-02-15 RX ADMIN — BUPRENORPHINE HCL 2 MG: 2 TABLET SUBLINGUAL at 08:24

## 2023-02-15 RX ADMIN — LORAZEPAM 2 MG: 2 TABLET ORAL at 16:46

## 2023-02-15 RX ADMIN — HALOPERIDOL LACTATE 5 MG: 5 INJECTION, SOLUTION INTRAMUSCULAR at 06:10

## 2023-02-15 RX ADMIN — CYCLOBENZAPRINE 10 MG: 10 TABLET, FILM COATED ORAL at 10:38

## 2023-02-15 RX ADMIN — ENOXAPARIN SODIUM 40 MG: 40 INJECTION SUBCUTANEOUS at 20:35

## 2023-02-15 RX ADMIN — LORAZEPAM 2 MG: 2 TABLET ORAL at 17:21

## 2023-02-15 NOTE — H&P
[facility]  Date: 2/15/2023   Patient Name: Margarito Wright  : 1970  MRN: [id number]  Primary Care Physician:  Provider, No Known  Date of admission: (Not on file)    Subjective   Subjective     Chief Complaint: Withdrawal syndrome    HPI:    Margarito Wright is a 52 y.o. male past medical history of substance abuse who presented to the emergency department 2 days prior for evaluation of withdrawal syndrome.  Patient was admitted to detox unit however he is requiring excessive amounts of benzodiazepine and remains agitated, hallucinating.  Discussed with psychiatry over the detox unit and they are concerned that he will decompensate due to excessive amount of medication required to keep his symptoms under control and therefore will transfer to PCU for ongoing care.  I evaluated the patient he is agitated, hallucinating, not answering my questions and unable to provide any further history.  Lab work was unrevealing in the emergency department.      Personal History     Past Medical History:  Past Medical History:   Diagnosis Date   • Substance abuse (HCC)          Past Surgical History:  Past Surgical History:   Procedure Laterality Date   • NO PAST SURGERIES           Family History:   Family History   Problem Relation Age of Onset   • Drug abuse Father          Social History:   Social History     Tobacco Use   • Smoking status: Former     Types: Cigarettes     Passive exposure: Never   • Smokeless tobacco: Never   • Tobacco comments:     Quit smoking 5-10 years ago   Vaping Use   • Vaping Use: Every day   • Substances: Nicotine, Flavoring   • Devices: Disposable, Pre-filled or refillable cartridge, Pre-filled pod   • Passive vaping exposure: Yes   Substance Use Topics   • Alcohol use: Not Currently     Comment: denies   • Drug use: Yes     Types: Benzodiazepines     Comment: opiates         Home Medications:       Allergies:  Allergies   Allergen Reactions   •  Penicillins GI Intolerance       Review of Systems   Unable to obtain due to mental status    Objective   Objective     Vitals:   Temp:  [97.9 °F (36.6 °C)-98.6 °F (37 °C)] 97.9 °F (36.6 °C)  Heart Rate:  [] 88  Resp:  [18] 18  BP: ()/(62-95) 134/77    Physical Exam    Constitutional: Agitated, not answering questions or following commands   Eyes: Pupils equal, sclerae anicteric, no conjunctival injection   HENT: NCAT, mucous membranes moist   Neck: Supple, no thyromegaly, no lymphadenopathy, trachea midline   Respiratory: Clear to auscultation bilaterally, nonlabored respirations    Cardiovascular: RRR, no murmurs, rubs, or gallops, palpable pedal pulses bilaterally   Gastrointestinal: Positive bowel sounds, soft, nontender, nondistended   Musculoskeletal: No bilateral ankle edema, no clubbing or cyanosis to extremities   Psychiatric: Not answering questions or following commands, agitated   Neurologic: Patient moves all 4 extremities however he was not answering my questions or following commands   Skin: No rashes     Result Review    Result Review:  I have personally reviewed the results from the time of this admission to 2/15/2023 18:02 EST and agree with these findings:  [x]  Laboratory  []  Microbiology  [x]  Radiology  []  EKG/Telemetry   []  Cardiology/Vascular   []  Pathology  []  Old records  []  Other:      Assessment & Plan   Assessment / Plan     Assessment/Plan:   • Alcohol withdrawal syndrome/benzodiazepine withdrawal: Patient has required excessive doses of Ativan while in the detox unit.  Greater than 25 mg of Ativan since yesterday per report.  Patient to be admitted to PCU for Precedex drip.  We will continue CIWA protocol along with as needed Ativan and vitamin supplementation.      DVT prophylaxis:  Medical DVT prophylaxis orders are present.  Lovenox    CODE STATUS:    Code Status (Patient has no pulse and is not breathing): CPR (Attempt to Resuscitate)  Medical Interventions  (Patient has pulse or is breathing): Full Support      Admission Status:  I believe this patient meets inpatient status.    Electronically signed by Hugo Pennington Jr, MD, 02/15/23, 6:02 PM EST.

## 2023-02-16 LAB
ALBUMIN SERPL-MCNC: 3.8 G/DL (ref 3.5–5.2)
ALBUMIN/GLOB SERPL: 1 G/DL
ALP SERPL-CCNC: 76 U/L (ref 39–117)
ALT SERPL W P-5'-P-CCNC: 27 U/L (ref 1–41)
ANION GAP SERPL CALCULATED.3IONS-SCNC: 9.6 MMOL/L (ref 5–15)
AST SERPL-CCNC: 37 U/L (ref 1–40)
BASOPHILS # BLD AUTO: 0.03 10*3/MM3 (ref 0–0.2)
BASOPHILS NFR BLD AUTO: 0.6 % (ref 0–1.5)
BILIRUB SERPL-MCNC: 0.4 MG/DL (ref 0–1.2)
BUN SERPL-MCNC: 12 MG/DL (ref 6–20)
BUN/CREAT SERPL: 12.9 (ref 7–25)
CALCIUM SPEC-SCNC: 8.6 MG/DL (ref 8.6–10.5)
CHLORIDE SERPL-SCNC: 108 MMOL/L (ref 98–107)
CO2 SERPL-SCNC: 23.4 MMOL/L (ref 22–29)
CREAT SERPL-MCNC: 0.93 MG/DL (ref 0.76–1.27)
DEPRECATED RDW RBC AUTO: 43.7 FL (ref 37–54)
EGFRCR SERPLBLD CKD-EPI 2021: 98.8 ML/MIN/1.73
EOSINOPHIL # BLD AUTO: 0.15 10*3/MM3 (ref 0–0.4)
EOSINOPHIL NFR BLD AUTO: 2.8 % (ref 0.3–6.2)
ERYTHROCYTE [DISTWIDTH] IN BLOOD BY AUTOMATED COUNT: 13 % (ref 12.3–15.4)
GLOBULIN UR ELPH-MCNC: 3.7 GM/DL
GLUCOSE SERPL-MCNC: 117 MG/DL (ref 65–99)
HCT VFR BLD AUTO: 40.5 % (ref 37.5–51)
HGB BLD-MCNC: 13.4 G/DL (ref 13–17.7)
IMM GRANULOCYTES # BLD AUTO: 0.01 10*3/MM3 (ref 0–0.05)
IMM GRANULOCYTES NFR BLD AUTO: 0.2 % (ref 0–0.5)
LYMPHOCYTES # BLD AUTO: 2.17 10*3/MM3 (ref 0.7–3.1)
LYMPHOCYTES NFR BLD AUTO: 40 % (ref 19.6–45.3)
MCH RBC QN AUTO: 30.4 PG (ref 26.6–33)
MCHC RBC AUTO-ENTMCNC: 33.1 G/DL (ref 31.5–35.7)
MCV RBC AUTO: 91.8 FL (ref 79–97)
MONOCYTES # BLD AUTO: 0.53 10*3/MM3 (ref 0.1–0.9)
MONOCYTES NFR BLD AUTO: 9.8 % (ref 5–12)
NEUTROPHILS NFR BLD AUTO: 2.53 10*3/MM3 (ref 1.7–7)
NEUTROPHILS NFR BLD AUTO: 46.6 % (ref 42.7–76)
NRBC BLD AUTO-RTO: 0 /100 WBC (ref 0–0.2)
PLATELET # BLD AUTO: 242 10*3/MM3 (ref 140–450)
PMV BLD AUTO: 8.4 FL (ref 6–12)
POTASSIUM SERPL-SCNC: 4.7 MMOL/L (ref 3.5–5.2)
PROT SERPL-MCNC: 7.5 G/DL (ref 6–8.5)
RBC # BLD AUTO: 4.41 10*6/MM3 (ref 4.14–5.8)
SODIUM SERPL-SCNC: 141 MMOL/L (ref 136–145)
TSH SERPL DL<=0.05 MIU/L-ACNC: 3.11 UIU/ML (ref 0.27–4.2)
WBC NRBC COR # BLD: 5.42 10*3/MM3 (ref 3.4–10.8)

## 2023-02-16 PROCEDURE — 99232 SBSQ HOSP IP/OBS MODERATE 35: CPT | Performed by: HOSPITALIST

## 2023-02-16 PROCEDURE — 80050 GENERAL HEALTH PANEL: CPT | Performed by: INTERNAL MEDICINE

## 2023-02-16 PROCEDURE — 25010000002 ENOXAPARIN PER 10 MG: Performed by: INTERNAL MEDICINE

## 2023-02-16 PROCEDURE — 25010000002 LORAZEPAM PER 2 MG: Performed by: INTERNAL MEDICINE

## 2023-02-16 RX ORDER — CHLORDIAZEPOXIDE HYDROCHLORIDE 25 MG/1
50 CAPSULE, GELATIN COATED ORAL EVERY 6 HOURS SCHEDULED
Status: COMPLETED | OUTPATIENT
Start: 2023-02-16 | End: 2023-02-17

## 2023-02-16 RX ORDER — FAMOTIDINE 10 MG/ML
20 INJECTION, SOLUTION INTRAVENOUS EVERY 12 HOURS SCHEDULED
Status: DISCONTINUED | OUTPATIENT
Start: 2023-02-16 | End: 2023-02-17

## 2023-02-16 RX ADMIN — CHLORDIAZEPOXIDE HYDROCHLORIDE 50 MG: 25 CAPSULE ORAL at 17:38

## 2023-02-16 RX ADMIN — LORAZEPAM 2 MG: 2 INJECTION INTRAMUSCULAR; INTRAVENOUS at 01:13

## 2023-02-16 RX ADMIN — LORAZEPAM 2 MG: 2 INJECTION INTRAMUSCULAR; INTRAVENOUS at 03:25

## 2023-02-16 RX ADMIN — LORAZEPAM 2 MG: 2 INJECTION INTRAMUSCULAR; INTRAVENOUS at 04:05

## 2023-02-16 RX ADMIN — LORAZEPAM 1 MG: 1 TABLET ORAL at 14:12

## 2023-02-16 RX ADMIN — FAMOTIDINE 20 MG: 10 INJECTION, SOLUTION INTRAVENOUS at 20:56

## 2023-02-16 RX ADMIN — Medication 10 ML: at 20:56

## 2023-02-16 RX ADMIN — FAMOTIDINE 20 MG: 10 INJECTION, SOLUTION INTRAVENOUS at 10:42

## 2023-02-16 RX ADMIN — Medication 10 ML: at 08:00

## 2023-02-16 RX ADMIN — CHLORDIAZEPOXIDE HYDROCHLORIDE 50 MG: 25 CAPSULE ORAL at 14:12

## 2023-02-16 RX ADMIN — CHLORDIAZEPOXIDE HYDROCHLORIDE 50 MG: 25 CAPSULE ORAL at 23:06

## 2023-02-16 RX ADMIN — ENOXAPARIN SODIUM 40 MG: 40 INJECTION SUBCUTANEOUS at 20:56

## 2023-02-16 RX ADMIN — DEXMEDETOMIDINE 1.1 MCG/KG/HR: 200 INJECTION, SOLUTION INTRAVENOUS at 08:07

## 2023-02-16 NOTE — PROGRESS NOTES
"Hospitalist Update:    Notified by RN that patient was \"getting wild again\". She notes patient was on maximum rate precedex at the beginning of the shift upon arrival from the [facility]. At that time he was difficult to awake with some apneic episodes so she began titrating his precedex down over the course of the evening until finally stopping it altogether. Then he started to wake up and became more irritable and talking about leaving AMA. I instructed her to start the precedex infusion back. A few minutes later, security alert was called overhead because the patient pulled off all his leads and monitors, got out of bed and tried to leave his room. He was reportedly more agitated at that time, but when security arrived he calmed down, got back in bed and allowed nursing staff to put his monitors back on. His speech is pressured. He is oriented to place and time but also thinks that he was at his home earlier (not the [facility]) when nurses were \"doping me up with ativan every few minutes\" and when we attempted to correct him that he was at the [facility] for detox, he replies \"No, I've got cameras all around my house to prove it.\" RN reports patient became bradycardic down to the upper 40s when she tried to titrate his precedex back up. I have her parameters to continue the precedex unless HR dropped below 45 or he becomes hemodynamically unstable. Continue to monitor closely in the PCU.   "

## 2023-02-16 NOTE — PLAN OF CARE
Goal Outcome Evaluation:           Progress: improving  Outcome Evaluation: Pt has been alert and oriented this shift. Pt has been sleeping intermittently throughout the shift. Precedex gtt is currently off. Pt is resting in bed with sitter. CIWA protocol still in place. VSS, on RA. Bed in low position, alarm on. Call light in reach. Pt denies any requests at this time.

## 2023-02-16 NOTE — PLAN OF CARE
Goal Outcome Evaluation:  Plan of Care Reviewed With: patient        Progress: declining  Outcome Evaluation: Pt recieved and delusional. Started precedex at order titration. Pt has all items in reach, will continue to monitor.

## 2023-02-16 NOTE — PROGRESS NOTES
[facility] HOSPITALIST PROGRESS NOTE     Patient Identification:  Name:  Margarito Wright  Age:  52 y.o.  Sex:  male  :  1970  MRN:  [id number]  Visit Number:  [id number]  ROOM: [address]     Primary Care Provider:  Provider, No Known    Length of stay:  1    Subjective        Chief Compliant Follow up for the benzodiazepine and alcohol withdrawals    Patient seen and examined this morning with no family at the bedside. Currently patient is sedated on precedex gtt. Last night trial done to wean off and patient woke up and talking about leaving AMA. precedex gtt resumed and currently at 1.5 mcg this morning. HR in 42-43. Discussed with RN and the precedex gtt weaned to 0.2 mcg this afternoon. HR in 50 now. Woke up on calling his name, told that he is feeling cold, after that did not talk further. Afebrile and no events overnight. On RA.       Objective     Current Hospital Meds:chlordiazePOXIDE, 50 mg, Oral, Q6H  enoxaparin, 40 mg, Subcutaneous, Nightly  famotidine, 20 mg, Intravenous, Q12H  thiamine, 100 mg, Oral, Daily   And  multivitamin, 1 tablet, Oral, Daily   And  folic acid, 1 mg, Oral, Daily  sodium chloride, 10 mL, Intravenous, Q12H    dexmedetomidine, 0.2-1.5 mcg/kg/hr, Last Rate: 0.2 mcg/kg/hr (23 1116)      ----------------------------------------------------------------------------------------------------------------------  Vital Signs:  Temp:  [97.4 °F (36.3 °C)-98.1 °F (36.7 °C)] 97.6 °F (36.4 °C)  Heart Rate:  [] 58  Resp:  [12-26] 21  BP: (102-174)/() 152/97  SpO2:  [90 %-100 %] 97 %  on  Flow (L/min):  [2] 2;   Device (Oxygen Therapy): room air  Body mass index is 25.83 kg/m².    Wt Readings from Last 3 Encounters:   23 81.6 kg (180 lb)   23 80.7 kg (178 lb)   23 78 kg (172 lb)       Intake/Output Summary (Last 24 hours) at 2023 1330  Last data filed at 2023 0900  Gross per 24 hour   Intake 687.85 ml   Output 800 ml   Net -112.15 ml      Diet: Regular/House Diet; Texture: Regular Texture (IDDSI 7); Fluid Consistency: Thin (IDDSI 0)  ----------------------------------------------------------------------------------------------------------------------  Physical exam:  General: Comfortable, Not in distress.  Well-developed and well-nourished.   HENT:  Head:  Normocephalic and atraumatic.  Mouth:  Moist mucous membranes.    Eyes:  Conjunctivae and EOM are normal.  Pupils are equal, round, and reactive to light.  No scleral icterus.    Neck:  Neck supple.  No JVD present.    Cardiovascular:  bradycardia, regular rhythm with no murmur.  Pulmonary/Chest:  No respiratory distress, no wheezes, no crackles, with normal breath sounds and good air movement.  Abdomen:  Soft.  Bowel sounds are normal.  No distension and no tenderness.   Musculoskeletal:  no tenderness, and no deformity.  No red or swollen joints anywhere.    Neurological: sedated, waking up.    Skin:  Skin is warm and dry. No rash noted. No pallor.   Peripheral vascular:  pulses in all 4 extremities with no clubbing, no cyanosis, no edema.  Genitourinary: no delarosa  ----------------------------------------------------------------------------------------------------------------------  ----------------------------------------------------------------------------------------------------------------------  Results from last 7 days   Lab Units 02/16/23  0011 02/12/23  1147   WBC 10*3/mm3 5.42 7.42   HEMOGLOBIN g/dL 13.4 14.2   HEMATOCRIT % 40.5 43.2   MCV fL 91.8 88.9   MCHC g/dL 33.1 32.9   PLATELETS 10*3/mm3 242 262     Results from last 7 days   Lab Units 02/16/23  0011 02/12/23  1147   SODIUM mmol/L 141 140   POTASSIUM mmol/L 4.7 3.8   MAGNESIUM mg/dL  --  2.1   CHLORIDE mmol/L 108* 105   CO2 mmol/L 23.4 26.9   BUN mg/dL 12 11   CREATININE mg/dL 0.93 0.91   CALCIUM mg/dL 8.6 9.5   GLUCOSE mg/dL 117* 133*   ALBUMIN g/dL 3.8 4.0   BILIRUBIN mg/dL 0.4 0.3   ALK PHOS U/L 76 96   AST (SGOT) U/L 37 30    ALT (SGPT) U/L 27 35   Estimated Creatinine Clearance: 107.2 mL/min (by C-G formula based on SCr of 0.93 mg/dL).      No results found for: HGBA1C, POCGLU  No results found for: AMMONIA    Results from last 7 days   Lab Units 02/12/23  1122   NITRITE UA  Negative   WBC UA /HPF 0-2   BACTERIA UA /HPF None Seen   SQUAM EPITHEL UA /HPF 0-2     No results found for: BLOODCXNo results found for: RESPCXNo results found for: URINECXNo results found for: WOUNDCXNo results found for: BODYFLDCXNo results found for: STOOLCX  I have personally looked at the labs and they are summarized above.  ----------------------------------------------------------------------------------------------------------------------  Imaging Results (Last 24 Hours)     ** No results found for the last 24 hours. **        I have personally reviewed the radiology images and read the final radiology report.    Assessment & Plan      Assessment:  Benzodiazepine withdrawal with delirium with H/O Severe benzodiazepine use disorder with xanax  Sinus bradycardia secondary to Precedex gtt  Opioid use disorder, severe, dependence with percocet and suboxone      Plan:  Benzodiazepine withdrawal with delirium with H/O Severe benzodiazepine use disorder and alcohol abuse, on the precedex gtt. Weaned 0.2 mcg this afternoon. Will start on librium. On CIWA protocol and the MVT, folate and thiamine. UDS +ve for benzodiazepine, suboxone and oxycodone.    Sinus bradycardia secondary to Precedex gtt, improved after the dose decreased. Check TSH. K, Mg normal.     Opioid use disorder, severe, dependence.    Lovenox sc for the DVT prophylaxis.     Management and the plans discussed in detail with the nursing.         The patient is high risk due to the following diagnoses/reasons:  Benzodiazepine withdrawal with delirium with H/O Severe benzodiazepine use disorder with xanax    [facility]  02/16/23  13:30 EST

## 2023-02-16 NOTE — PLAN OF CARE
Goal Outcome Evaluation:  Plan of Care Reviewed With: patient        Progress: improving  Outcome Evaluation: PT has been alert and disoriented to place and situation this shift. PT has been intermittently sleeping, and has been agitated a few times this shift. See Provider Note. ROSIBELWA Protocol followed. Precedex drip infusing at 1.5mcg/kg/hr. Sitter Remains at Bedside. Fall Precautions in place. No complaints of pain or discomfort. VSS. Afebrile. Will Continue to Monitor PT.      Problem: Adult Inpatient Plan of Care  Goal: Plan of Care Review  Outcome: Ongoing, Progressing  Flowsheets (Taken 2/16/2023 0555)  Progress: improving  Plan of Care Reviewed With: patient  Outcome Evaluation: PT has been alert and disoriented to place and situation this shift. PT has been intermittently sleeping, and has been agitated a few times this shift. See Provider Note. CIWA Protocol followed. Precedex drip infusing at 1.5mcg/kg/hr. Sitter Remains at Bedside. Fall Precautions in place. No complaints of pain or discomfort. VSS. Afebrile. Will Continue to Monitor PT.  Goal: Patient-Specific Goal (Individualized)  Outcome: Ongoing, Progressing  Goal: Absence of Hospital-Acquired Illness or Injury  Outcome: Ongoing, Progressing  Intervention: Identify and Manage Fall Risk  Recent Flowsheet Documentation  Taken 2/16/2023 0500 by Lolly Grider, RN  Safety Promotion/Fall Prevention:   activity supervised   assistive device/personal items within reach   clutter free environment maintained   fall prevention program maintained   nonskid shoes/slippers when out of bed   room organization consistent   safety round/check completed  Taken 2/16/2023 0300 by Lolly Grider, RN  Safety Promotion/Fall Prevention:   activity supervised   assistive device/personal items within reach   clutter free environment maintained   fall prevention program maintained   nonskid shoes/slippers when out of bed   room organization consistent   safety round/check  completed  Taken 2/16/2023 0215 by Lolly Grider RN  Safety Promotion/Fall Prevention:   activity supervised   assistive device/personal items within reach   clutter free environment maintained   fall prevention program maintained   nonskid shoes/slippers when out of bed   room organization consistent   safety round/check completed  Taken 2/16/2023 0100 by Lolly Grider, RN  Safety Promotion/Fall Prevention:   activity supervised   assistive device/personal items within reach   clutter free environment maintained   fall prevention program maintained   nonskid shoes/slippers when out of bed   room organization consistent   safety round/check completed  Taken 2/15/2023 2300 by Lolly Grider, RN  Safety Promotion/Fall Prevention:   activity supervised   assistive device/personal items within reach   clutter free environment maintained   fall prevention program maintained   nonskid shoes/slippers when out of bed   room organization consistent   safety round/check completed  Taken 2/15/2023 2100 by Lolly Grider, RN  Safety Promotion/Fall Prevention:   activity supervised   assistive device/personal items within reach   clutter free environment maintained   fall prevention program maintained   nonskid shoes/slippers when out of bed   room organization consistent   safety round/check completed  Taken 2/15/2023 2030 by Lolly Grider, RN  Safety Promotion/Fall Prevention:   activity supervised   assistive device/personal items within reach   clutter free environment maintained   fall prevention program maintained   nonskid shoes/slippers when out of bed   safety round/check completed   room organization consistent  Intervention: Prevent Skin Injury  Recent Flowsheet Documentation  Taken 2/16/2023 0215 by Lolly Grider, RN  Body Position: position changed independently  Skin Protection:   drying agents applied   adhesive use limited   incontinence pads utilized  Taken 2/15/2023 2030 by Lolly Grider, RN  Body  Position: position changed independently  Skin Protection:   adhesive use limited   drying agents applied   skin-to-device areas padded   skin-to-skin areas padded  Intervention: Prevent and Manage VTE (Venous Thromboembolism) Risk  Recent Flowsheet Documentation  Taken 2/16/2023 0215 by Lolly Grider RN  Activity Management: activity adjusted per tolerance  Taken 2/15/2023 2030 by Lolly Grider RN  Activity Management: activity adjusted per tolerance  Range of Motion: active ROM (range of motion) encouraged  Intervention: Prevent Infection  Recent Flowsheet Documentation  Taken 2/16/2023 0500 by Lolly Grider RN  Infection Prevention: rest/sleep promoted  Taken 2/16/2023 0300 by Lolly Grider RN  Infection Prevention: rest/sleep promoted  Taken 2/16/2023 0215 by Lolly Grider RN  Infection Prevention: rest/sleep promoted  Taken 2/16/2023 0100 by Lolly Grider RN  Infection Prevention: rest/sleep promoted  Taken 2/15/2023 2300 by Lolly Grider RN  Infection Prevention: rest/sleep promoted  Taken 2/15/2023 2100 by Lolly Grider RN  Infection Prevention: rest/sleep promoted  Taken 2/15/2023 2030 by Lolly Grider RN  Infection Prevention: rest/sleep promoted  Taken 2/15/2023 1900 by Lolly Grider RN  Infection Prevention: rest/sleep promoted  Goal: Optimal Comfort and Wellbeing  Outcome: Ongoing, Progressing  Intervention: Provide Person-Centered Care  Recent Flowsheet Documentation  Taken 2/16/2023 0405 by Lolly Grider RN  Trust Relationship/Rapport:   care explained   choices provided   empathic listening provided   questions answered   reassurance provided   thoughts/feelings acknowledged  Taken 2/16/2023 0325 by Lolly Grider RN  Trust Relationship/Rapport:   care explained   choices provided   empathic listening provided   questions answered   reassurance provided   thoughts/feelings acknowledged  Taken 2/16/2023 0045 by Lolly Grider RN  Trust Relationship/Rapport:   care  explained   choices provided   empathic listening provided   questions answered   reassurance provided   thoughts/feelings acknowledged  Taken 2/15/2023 2030 by Lolly Grider, RN  Trust Relationship/Rapport:   choices provided   care explained   empathic listening provided   questions answered   reassurance provided   thoughts/feelings acknowledged  Goal: Readiness for Transition of Care  Outcome: Ongoing, Progressing

## 2023-02-16 NOTE — PAYOR COMM NOTE
"  [facility]  NPI: [id number]    Utilization Review   Contact:Aubree Kiser MSN, APRN, NP-C  Phone: [phone]  Fax: [phone]      Inpatient Auth Req  Ref [id number]  Dx: f10.939 (medical admit )  Margarito Ortiz (52 y.o. Male)     Date of Birth   1970    Social Security Number       Address   [address] [place] [address]    Home Phone   [phone]    MRN   [id number]       Alevism   None    Marital Status                               Admission Date   2/15/23    Admission Type   Urgent    Admitting Provider   Hugo Pennington Jr., MD    Attending Provider   Manisha Manley MD    Department, Room/Bed   [facility] PROGRESS CARE, P206/1P       Discharge Date       Discharge Disposition       Discharge Destination                               Attending Provider: Manisha Manley MD    Allergies: Penicillins    Isolation: None   Infection: None   Code Status: CPR    Ht: 177.8 cm (70\")   Wt: 81.6 kg (180 lb)    Admission Cmt: None   Principal Problem: Alcohol withdrawal (HCC) [F10.939]                 Active Insurance as of 2/15/2023     Primary Coverage     Payor Plan Insurance Group Employer/Plan Group    [employer] [place] [employer] [place]ICA[employer]      Payor Plan Address Payor Plan Phone Number Payor Plan Fax Number Effective Dates     [address] [address] [phone]  2019 - None Entered    [place] [id number]       Subscriber Name Subscriber Birth Date Member ID       MARGARITO ORTIZ 1970 [id number]                 Emergency Contacts      (Rel.) Home Phone Work Phone Mobile Phone    WATERHOUSE,KIMBERLY (Sister) [phone] -- --               History & Physical      Hugo Pennington Jr., MD at 02/15/23 1802           [facility]  Date: 2/15/2023   Patient Name: Margarito Ortiz  : 1970  MRN: [id number]  Primary Care Physician:  Provider, No Known  Date of admission: (Not on file)    Subjective    Subjective "     Chief Complaint: Withdrawal syndrome    HPI:    Margarito Wright is a 52 y.o. male past medical history of substance abuse who presented to the emergency department 2 days prior for evaluation of withdrawal syndrome.  Patient was admitted to detox unit however he is requiring excessive amounts of benzodiazepine and remains agitated, hallucinating.  Discussed with psychiatry over the detox unit and they are concerned that he will decompensate due to excessive amount of medication required to keep his symptoms under control and therefore will transfer to PCU for ongoing care.  I evaluated the patient he is agitated, hallucinating, not answering my questions and unable to provide any further history.  Lab work was unrevealing in the emergency department.      Personal History     Past Medical History:  Past Medical History:   Diagnosis Date   • Substance abuse (HCC)          Past Surgical History:  Past Surgical History:   Procedure Laterality Date   • NO PAST SURGERIES           Family History:   Family History   Problem Relation Age of Onset   • Drug abuse Father          Social History:   Social History     Tobacco Use   • Smoking status: Former     Types: Cigarettes     Passive exposure: Never   • Smokeless tobacco: Never   • Tobacco comments:     Quit smoking 5-10 years ago   Vaping Use   • Vaping Use: Every day   • Substances: Nicotine, Flavoring   • Devices: Disposable, Pre-filled or refillable cartridge, Pre-filled pod   • Passive vaping exposure: Yes   Substance Use Topics   • Alcohol use: Not Currently     Comment: denies   • Drug use: Yes     Types: Benzodiazepines     Comment: opiates         Home Medications:       Allergies:  Allergies   Allergen Reactions   • Penicillins GI Intolerance       Review of Systems   Unable to obtain due to mental status    Objective    Objective     Vitals:   Temp:  [97.9 °F (36.6 °C)-98.6 °F (37 °C)] 97.9 °F (36.6 °C)  Heart Rate:  [] 88  Resp:  [18] 18  BP:  ()/(62-95) 134/77    Physical Exam    Constitutional: Agitated, not answering questions or following commands   Eyes: Pupils equal, sclerae anicteric, no conjunctival injection   HENT: NCAT, mucous membranes moist   Neck: Supple, no thyromegaly, no lymphadenopathy, trachea midline   Respiratory: Clear to auscultation bilaterally, nonlabored respirations    Cardiovascular: RRR, no murmurs, rubs, or gallops, palpable pedal pulses bilaterally   Gastrointestinal: Positive bowel sounds, soft, nontender, nondistended   Musculoskeletal: No bilateral ankle edema, no clubbing or cyanosis to extremities   Psychiatric: Not answering questions or following commands, agitated   Neurologic: Patient moves all 4 extremities however he was not answering my questions or following commands   Skin: No rashes     Result Review    Result Review:  I have personally reviewed the results from the time of this admission to 2/15/2023 18:02 EST and agree with these findings:  [x]  Laboratory  []  Microbiology  [x]  Radiology  []  EKG/Telemetry   []  Cardiology/Vascular   []  Pathology  []  Old records  []  Other:      Assessment & Plan   Assessment / Plan     Assessment/Plan:   • Alcohol withdrawal syndrome/benzodiazepine withdrawal: Patient has required excessive doses of Ativan while in the detox unit.  Greater than 25 mg of Ativan since yesterday per report.  Patient to be admitted to PCU for Precedex drip.  We will continue CIWA protocol along with as needed Ativan and vitamin supplementation.      DVT prophylaxis:  Medical DVT prophylaxis orders are present.  Lovenox    CODE STATUS:    Code Status (Patient has no pulse and is not breathing): CPR (Attempt to Resuscitate)  Medical Interventions (Patient has pulse or is breathing): Full Support      Admission Status:  I believe this patient meets inpatient status.    Electronically signed by Hugo Pennington Jr, MD, 02/15/23, 6:02 PM EST.             Electronically signed by Aditi  "Hugo HUMPHREY Jr., MD at 02/15/23 1804       Emergency Department Notes    No notes of this type exist for this encounter.           Ventilator/Non-Invasive Ventilation Settings (From admission, onward)    None           Physician Progress Notes (last 48 hours)      Harry Lares MD at 02/16/23 0323        Hospitalist Update:    Notified by RN that patient was \"getting wild again\". She notes patient was on maximum rate precedex at the beginning of the shift upon arrival from the [facility]. At that time he was difficult to awake with some apneic episodes so she began titrating his precedex down over the course of the evening until finally stopping it altogether. Then he started to wake up and became more irritable and talking about leaving AMA. I instructed her to start the precedex infusion back. A few minutes later, security alert was called overhead because the patient pulled off all his leads and monitors, got out of bed and tried to leave his room. He was reportedly more agitated at that time, but when security arrived he calmed down, got back in bed and allowed nursing staff to put his monitors back on. His speech is pressured. He is oriented to place and time but also thinks that he was at his home earlier (not the [facility]) when nurses were \"doping me up with ativan every few minutes\" and when we attempted to correct him that he was at the [facility] for detox, he replies \"No, I've got cameras all around my house to prove it.\" RN reports patient became bradycardic down to the upper 40s when she tried to titrate his precedex back up. I have her parameters to continue the precedex unless HR dropped below 45 or he becomes hemodynamically unstable. Continue to monitor closely in the PCU.     Electronically signed by Harry Lares MD at 02/16/23 0330       Consult Notes (last 48 hours)  Notes from 02/14/23 0716 through 02/16/23 0716   No notes of this type exist for this encounter.   "

## 2023-02-17 VITALS
WEIGHT: 180.56 LBS | OXYGEN SATURATION: 99 % | TEMPERATURE: 97.8 F | SYSTOLIC BLOOD PRESSURE: 144 MMHG | DIASTOLIC BLOOD PRESSURE: 53 MMHG | RESPIRATION RATE: 13 BRPM | BODY MASS INDEX: 25.85 KG/M2 | HEART RATE: 77 BPM | HEIGHT: 70 IN

## 2023-02-17 LAB
ANION GAP SERPL CALCULATED.3IONS-SCNC: 12.5 MMOL/L (ref 5–15)
BUN SERPL-MCNC: 16 MG/DL (ref 6–20)
BUN/CREAT SERPL: 13.9 (ref 7–25)
CALCIUM SPEC-SCNC: 9 MG/DL (ref 8.6–10.5)
CHLORIDE SERPL-SCNC: 103 MMOL/L (ref 98–107)
CO2 SERPL-SCNC: 21.5 MMOL/L (ref 22–29)
CREAT SERPL-MCNC: 1.15 MG/DL (ref 0.76–1.27)
DEPRECATED RDW RBC AUTO: 42.3 FL (ref 37–54)
EGFRCR SERPLBLD CKD-EPI 2021: 76.6 ML/MIN/1.73
ERYTHROCYTE [DISTWIDTH] IN BLOOD BY AUTOMATED COUNT: 12.8 % (ref 12.3–15.4)
GLUCOSE SERPL-MCNC: 123 MG/DL (ref 65–99)
HCT VFR BLD AUTO: 41.1 % (ref 37.5–51)
HGB BLD-MCNC: 13.8 G/DL (ref 13–17.7)
MCH RBC QN AUTO: 30 PG (ref 26.6–33)
MCHC RBC AUTO-ENTMCNC: 33.6 G/DL (ref 31.5–35.7)
MCV RBC AUTO: 89.3 FL (ref 79–97)
PLATELET # BLD AUTO: 247 10*3/MM3 (ref 140–450)
PMV BLD AUTO: 8.6 FL (ref 6–12)
POTASSIUM SERPL-SCNC: 3.8 MMOL/L (ref 3.5–5.2)
RBC # BLD AUTO: 4.6 10*6/MM3 (ref 4.14–5.8)
SODIUM SERPL-SCNC: 137 MMOL/L (ref 136–145)
WBC NRBC COR # BLD: 7.14 10*3/MM3 (ref 3.4–10.8)

## 2023-02-17 PROCEDURE — 85027 COMPLETE CBC AUTOMATED: CPT | Performed by: HOSPITALIST

## 2023-02-17 PROCEDURE — 80048 BASIC METABOLIC PNL TOTAL CA: CPT | Performed by: HOSPITALIST

## 2023-02-17 PROCEDURE — 99231 SBSQ HOSP IP/OBS SF/LOW 25: CPT | Performed by: HOSPITALIST

## 2023-02-17 RX ORDER — FAMOTIDINE 20 MG/1
20 TABLET, FILM COATED ORAL
Status: DISCONTINUED | OUTPATIENT
Start: 2023-02-17 | End: 2023-02-17 | Stop reason: HOSPADM

## 2023-02-17 RX ORDER — CHLORDIAZEPOXIDE HYDROCHLORIDE 25 MG/1
50 CAPSULE, GELATIN COATED ORAL EVERY 8 HOURS SCHEDULED
Status: DISCONTINUED | OUTPATIENT
Start: 2023-02-17 | End: 2023-02-17 | Stop reason: HOSPADM

## 2023-02-17 RX ADMIN — CHLORDIAZEPOXIDE HYDROCHLORIDE 50 MG: 25 CAPSULE ORAL at 05:03

## 2023-02-17 RX ADMIN — Medication 1 MG: at 08:49

## 2023-02-17 RX ADMIN — Medication 100 MG: at 08:49

## 2023-02-17 RX ADMIN — FAMOTIDINE 20 MG: 20 TABLET, FILM COATED ORAL at 08:52

## 2023-02-17 RX ADMIN — Medication 10 ML: at 08:50

## 2023-02-17 RX ADMIN — Medication 1 TABLET: at 08:49

## 2023-02-17 NOTE — PLAN OF CARE
"Goal Outcome Evaluation:  Plan of Care Reviewed With: patient        Progress: improving  Outcome Evaluation: PT has been mostly alert and oriented this shift. He is still confused as to situation, PT continues to talk about mistrusting staff and believing he \"flat lined\" downstairs. PT has been on room air and has been up to the bathroom and walked around room throughout shift. PT has not had any ativan this shift. PT still can be mistrustful of staff at times, but remains cooperative with care. VSS. Afebrile. Will Continue to Monitor PT.      Problem: Adult Inpatient Plan of Care  Goal: Plan of Care Review  Outcome: Ongoing, Progressing  Flowsheets (Taken 2/17/2023 0347)  Progress: improving  Plan of Care Reviewed With: patient  Outcome Evaluation: PT has been mostly alert and oriented this shift. He is still confused as to situation, PT continues to talk about mistrusting staff and believing he \"flat lined\" downstairs. PT has been on room air and has been up to the bathroom and walked around room throughout shift. PT has not had any ativan this shift. PT still can be mistrustful of staff at times, but remains cooperative with care. VSS. Afebrile. Will Continue to Monitor PT.  Goal: Patient-Specific Goal (Individualized)  Outcome: Ongoing, Progressing  Goal: Absence of Hospital-Acquired Illness or Injury  Outcome: Ongoing, Progressing  Intervention: Identify and Manage Fall Risk  Recent Flowsheet Documentation  Taken 2/17/2023 0300 by Lolly Grider, RN  Safety Promotion/Fall Prevention:   activity supervised   assistive device/personal items within reach   clutter free environment maintained   fall prevention program maintained   nonskid shoes/slippers when out of bed   room organization consistent   safety round/check completed  Taken 2/17/2023 0200 by Lolly Grider, RN  Safety Promotion/Fall Prevention:   activity supervised   assistive device/personal items within reach   clutter free environment " maintained   fall prevention program maintained   nonskid shoes/slippers when out of bed   room organization consistent   safety round/check completed  Taken 2/17/2023 0100 by Lolly Grider RN  Safety Promotion/Fall Prevention:   activity supervised   assistive device/personal items within reach   clutter free environment maintained   fall prevention program maintained   nonskid shoes/slippers when out of bed   room organization consistent   safety round/check completed  Taken 2/16/2023 2300 by Lolly Grider, RN  Safety Promotion/Fall Prevention:   clutter free environment maintained   assistive device/personal items within reach   activity supervised   fall prevention program maintained   nonskid shoes/slippers when out of bed   room organization consistent   safety round/check completed  Taken 2/16/2023 2100 by Lolly Grider RN  Safety Promotion/Fall Prevention:   activity supervised   assistive device/personal items within reach   clutter free environment maintained   fall prevention program maintained   nonskid shoes/slippers when out of bed   room organization consistent   safety round/check completed  Taken 2/16/2023 1900 by Lolly Grider, RN  Safety Promotion/Fall Prevention:   activity supervised   assistive device/personal items within reach   clutter free environment maintained   fall prevention program maintained   nonskid shoes/slippers when out of bed   room organization consistent   safety round/check completed  Intervention: Prevent Skin Injury  Recent Flowsheet Documentation  Taken 2/17/2023 0200 by Lolly Grider, RN  Body Position: position changed independently  Skin Protection:   adhesive use limited   drying agents applied   incontinence pads utilized  Taken 2/16/2023 2100 by Lolly Grider, RN  Body Position: position changed independently  Skin Protection:   adhesive use limited   drying agents applied   incontinence pads utilized  Intervention: Prevent and Manage VTE (Venous  Thromboembolism) Risk  Recent Flowsheet Documentation  Taken 2/17/2023 0200 by Lolly Grider RN  Activity Management: activity adjusted per tolerance  VTE Prevention/Management: (Lovenox SQ) other (see comments)  Range of Motion: active ROM (range of motion) encouraged  Taken 2/16/2023 2100 by Lolly Grider RN  Activity Management: activity adjusted per tolerance  VTE Prevention/Management: (Lovenox SQ) other (see comments)  Intervention: Prevent Infection  Recent Flowsheet Documentation  Taken 2/17/2023 0300 by Lolly Grider RN  Infection Prevention: rest/sleep promoted  Taken 2/17/2023 0200 by Lolly Grider RN  Infection Prevention: rest/sleep promoted  Taken 2/17/2023 0100 by Lolyl Grider RN  Infection Prevention: rest/sleep promoted  Taken 2/16/2023 2300 by Lolly Grider RN  Infection Prevention: rest/sleep promoted  Taken 2/16/2023 2100 by Lolly Grider RN  Infection Prevention: rest/sleep promoted  Taken 2/16/2023 1900 by Lolly Grider RN  Infection Prevention: rest/sleep promoted  Goal: Optimal Comfort and Wellbeing  Outcome: Ongoing, Progressing  Intervention: Provide Person-Centered Care  Recent Flowsheet Documentation  Taken 2/17/2023 0200 by Lolly Grider RN  Trust Relationship/Rapport:   care explained   choices provided   empathic listening provided   questions answered   thoughts/feelings acknowledged   reassurance provided  Taken 2/16/2023 2100 by Lolly Grider RN  Trust Relationship/Rapport:   care explained   choices provided   empathic listening provided   questions answered   reassurance provided   thoughts/feelings acknowledged  Goal: Readiness for Transition of Care  Outcome: Ongoing, Progressing     Problem: Activity and Energy Impairment (Excessive Substance Use)  Goal: Optimized Energy Level (Excessive Substance Use)  Outcome: Ongoing, Progressing     Problem: Behavior Regulation Impairment (Excessive Substance Use)  Goal: Improved Behavioral Control (Excessive  Substance Use)  Outcome: Ongoing, Progressing     Problem: Decreased Participation and Engagement (Excessive Substance Use)  Goal: Increased Participation and Engagement (Excessive Substance Use)  Outcome: Ongoing, Progressing  Intervention: Facilitate Participation and Engagement  Recent Flowsheet Documentation  Taken 2/17/2023 0200 by Lolly Grider RN  Supportive Measures:   active listening utilized   self-care encouraged   relaxation techniques promoted  Taken 2/16/2023 2100 by Lolly Grider RN  Supportive Measures:   active listening utilized   counseling provided   relaxation techniques promoted   self-care encouraged     Problem: Physiologic Impairment (Excessive Substance Use)  Goal: Improved Physiologic Symptoms (Excessive Substance Use)  Outcome: Ongoing, Progressing     Problem: Social, Occupational or Functional Impairment (Excessive Substance Use)  Goal: Enhanced Social, Occupational or Functional Skills (Excessive Substance Use)  Outcome: Ongoing, Progressing  Intervention: Promote Social, Occupational and Functional Ability  Recent Flowsheet Documentation  Taken 2/17/2023 0200 by Lolly Girder RN  Trust Relationship/Rapport:   care explained   choices provided   empathic listening provided   questions answered   thoughts/feelings acknowledged   reassurance provided  Taken 2/16/2023 2100 by Lolly Grider RN  Trust Relationship/Rapport:   care explained   choices provided   empathic listening provided   questions answered   reassurance provided   thoughts/feelings acknowledged     Problem: Skin Injury Risk Increased  Goal: Skin Health and Integrity  Outcome: Ongoing, Progressing  Intervention: Promote and Optimize Oral Intake  Recent Flowsheet Documentation  Taken 2/17/2023 0200 by Lolly Grider RN  Oral Nutrition Promotion: rest periods promoted  Taken 2/16/2023 2100 by Lolly Grider RN  Oral Nutrition Promotion: rest periods promoted  Intervention: Optimize Skin Protection  Recent  Flowsheet Documentation  Taken 2/17/2023 0200 by Lolly Grider RN  Pressure Reduction Techniques:   frequent weight shift encouraged   weight shift assistance provided  Head of Bed (HOB) Positioning: HOB at 30-45 degrees  Pressure Reduction Devices: pressure-redistributing mattress utilized  Skin Protection:   adhesive use limited   drying agents applied   incontinence pads utilized  Taken 2/16/2023 2100 by Lolly Grider, RN  Pressure Reduction Techniques:   frequent weight shift encouraged   weight shift assistance provided  Head of Bed (HOB) Positioning: HOB at 30-45 degrees  Pressure Reduction Devices: pressure-redistributing mattress utilized  Skin Protection:   adhesive use limited   drying agents applied   incontinence pads utilized     Problem: Fall Injury Risk  Goal: Absence of Fall and Fall-Related Injury  Outcome: Ongoing, Progressing  Intervention: Identify and Manage Contributors  Recent Flowsheet Documentation  Taken 2/17/2023 0300 by Lolly Grider RN  Medication Review/Management: medications reviewed  Taken 2/17/2023 0200 by Lolly Grider RN  Medication Review/Management: medications reviewed  Self-Care Promotion:   independence encouraged   BADL personal objects within reach   BADL personal routines maintained  Taken 2/17/2023 0100 by Lolly Grider, RN  Medication Review/Management: medications reviewed  Taken 2/16/2023 2300 by Lolly Grider RN  Medication Review/Management: medications reviewed  Taken 2/16/2023 2100 by Lolly Grider RN  Medication Review/Management: medications reviewed  Self-Care Promotion:   independence encouraged   BADL personal objects within reach   BADL personal routines maintained  Taken 2/16/2023 1900 by Lolly Grider, RN  Medication Review/Management: medications reviewed  Intervention: Promote Injury-Free Environment  Recent Flowsheet Documentation  Taken 2/17/2023 0300 by Lolly Grider, RN  Safety Promotion/Fall Prevention:   activity supervised    assistive device/personal items within reach   clutter free environment maintained   fall prevention program maintained   nonskid shoes/slippers when out of bed   room organization consistent   safety round/check completed  Taken 2/17/2023 0200 by Lolly Grider RN  Safety Promotion/Fall Prevention:   activity supervised   assistive device/personal items within reach   clutter free environment maintained   fall prevention program maintained   nonskid shoes/slippers when out of bed   room organization consistent   safety round/check completed  Taken 2/17/2023 0100 by Lolly Grider, RN  Safety Promotion/Fall Prevention:   activity supervised   assistive device/personal items within reach   clutter free environment maintained   fall prevention program maintained   nonskid shoes/slippers when out of bed   room organization consistent   safety round/check completed  Taken 2/16/2023 2300 by Lolly Grider, RN  Safety Promotion/Fall Prevention:   clutter free environment maintained   assistive device/personal items within reach   activity supervised   fall prevention program maintained   nonskid shoes/slippers when out of bed   room organization consistent   safety round/check completed  Taken 2/16/2023 2100 by Lolly Grider, RN  Safety Promotion/Fall Prevention:   activity supervised   assistive device/personal items within reach   clutter free environment maintained   fall prevention program maintained   nonskid shoes/slippers when out of bed   room organization consistent   safety round/check completed  Taken 2/16/2023 1900 by Lolly Grider, RN  Safety Promotion/Fall Prevention:   activity supervised   assistive device/personal items within reach   clutter free environment maintained   fall prevention program maintained   nonskid shoes/slippers when out of bed   room organization consistent   safety round/check completed

## 2023-02-17 NOTE — CONSULTS
Reviewed notes and patient chart.  Patient is a 52-year-old male with a history of substance abuse who presented for withdrawals requesting detox and initially was admitted for detox at the [facility] but had to be transferred to medical due to worsening status and DTs.  At this point in time, patient has been off of Precedex since yesterday.  Vitals are relatively stable.  He has been up walking around the room without any noted unsteadiness but continues to be confused and mistrustful of staff.  Patient seems to be stable enough for detox on the detox unit so we will transfer back to inpatient detox to continue his treatment.  Contacted lead nurse.

## 2023-02-18 NOTE — PAYOR COMM NOTE
"[facility]  FROY FRANCIS  PHONE  [phone]  FAX  [phone]  NPI:  [id number]    PATIENT D/C 2/17/2023    Margarito Ortiz (52 y.o. Male)     Date of Birth   1970    Social Security Number       Address   [address] [facility] [place] [address]    Home Phone   [phone]    MRN   [id number]       Restorationist   None    Marital Status                               Admission Date   2/15/23    Admission Type   Urgent    Admitting Provider   Hugo Pennington Jr., MD    Attending Provider       Department, Room/Bed   [facility] PROGRESS CARE, P206/1P       Discharge Date   2/17/2023    Discharge Disposition   Left Against Medical Advice    Discharge Destination                               Attending Provider: (none)   Allergies: Penicillins    Isolation: None   Infection: None   Code Status: Prior    Ht: 177.8 cm (70\")   Wt: 81.9 kg (180 lb 8.9 oz)    Admission Cmt: None   Principal Problem: Alcohol withdrawal (HCC) [F10.939]                 Active Insurance as of 2/15/2023     Primary Coverage     Payor Plan Insurance Group Employer/Plan Group    [employer] [place] [employer]ICA[employer]      Payor Plan Address Payor Plan Phone Number Payor Plan Fax Number Effective Dates    [address] [address] [phone]  9/6/2019 - None Entered    [facility] [id number]       Subscriber Name Subscriber Birth Date Member ID       MARAGRITO ORTIZ 1970 [id number]                 Emergency Contacts      (Rel.) Home Phone Work Phone Mobile Phone    HEMANTDAISY LÓPEZLY (Sister) [phone] -- --              "

## 2023-02-18 NOTE — PROGRESS NOTES
[facility]     Patient Identification:  Name:  Margarito Wright  Age:  52 y.o.  Sex:  male  :  1970  MRN:  [id number]  Visit Number:  [id number]  ROOM: [address]     Primary Care Provider:  Provider, No Known    Length of stay:  2    Subjective        Chief Compliant Follow up for the benzodiazepine and alcohol withdrawals    Patient seen and examined this morning with no family at the bedside. Currently patient is alert, awake and oriented. Weaned off of the precedex gtt. Last night. Afebrile and no events overnight. On RA. Ambulating. Denies any complaints. Denies chest pain, sob, cough. Tolerating diet.       Objective   ----------------------------------------------------------------------------------------------------------------------  Vital Signs:  Temp:  [97.4 °F (36.3 °C)-98.7 °F (37.1 °C)] 97.8 °F (36.6 °C)  Heart Rate:  [67-97] 77  Resp:  [12-24] 13  BP: (103-144)/(53-79) 144/53  SpO2:  [97 %-99 %] 99 %  on   ;   Device (Oxygen Therapy): room air  Body mass index is 25.91 kg/m².    Wt Readings from Last 3 Encounters:   23 81.9 kg (180 lb 8.9 oz)   23 80.7 kg (178 lb)   23 78 kg (172 lb)       Intake/Output Summary (Last 24 hours) at 2023  Last data filed at 2023 0800  Gross per 24 hour   Intake 1128 ml   Output --   Net 1128 ml     No diet orders on file  ----------------------------------------------------------------------------------------------------------------------  Physical exam:  General: Comfortable, Not in distress.  Well-developed and well-nourished.   HENT:  Head:  Normocephalic and atraumatic.  Mouth:  Moist mucous membranes.    Eyes:  Conjunctivae and EOM are normal.  Pupils are equal, round, and reactive to light.  No scleral icterus.    Neck:  Neck supple.  No JVD present.    Cardiovascular:  bradycardia, regular rhythm with no murmur.  Pulmonary/Chest:  No respiratory distress, no wheezes, no crackles, with  normal breath sounds and good air movement.  Abdomen:  Soft.  Bowel sounds are normal.  No distension and no tenderness.   Musculoskeletal:  no tenderness, and no deformity.  No red or swollen joints anywhere.    Neurological: Alert awake oriented*4, no focal neurological deficit.  No facial droop.  No slurring of speech  Skin:  Skin is warm and dry. No rash noted. No pallor.   Peripheral vascular:  pulses in all 4 extremities with no clubbing, no cyanosis, no edema.  Genitourinary: no delarosa  ----------------------------------------------------------------------------------------------------------------------  ----------------------------------------------------------------------------------------------------------------------  Results from last 7 days   Lab Units 02/17/23 0035 02/16/23  0011 02/12/23  1147   WBC 10*3/mm3 7.14 5.42 7.42   HEMOGLOBIN g/dL 13.8 13.4 14.2   HEMATOCRIT % 41.1 40.5 43.2   MCV fL 89.3 91.8 88.9   MCHC g/dL 33.6 33.1 32.9   PLATELETS 10*3/mm3 247 242 262     Results from last 7 days   Lab Units 02/17/23 0035 02/16/23 0011 02/12/23  1147   SODIUM mmol/L 137 141 140   POTASSIUM mmol/L 3.8 4.7 3.8   MAGNESIUM mg/dL  --   --  2.1   CHLORIDE mmol/L 103 108* 105   CO2 mmol/L 21.5* 23.4 26.9   BUN mg/dL 16 12 11   CREATININE mg/dL 1.15 0.93 0.91   CALCIUM mg/dL 9.0 8.6 9.5   GLUCOSE mg/dL 123* 117* 133*   ALBUMIN g/dL  --  3.8 4.0   BILIRUBIN mg/dL  --  0.4 0.3   ALK PHOS U/L  --  76 96   AST (SGOT) U/L  --  37 30   ALT (SGPT) U/L  --  27 35   Estimated Creatinine Clearance: 87 mL/min (by C-G formula based on SCr of 1.15 mg/dL).      No results found for: HGBA1C, POCGLU  No results found for: AMMONIA    Results from last 7 days   Lab Units 02/12/23  1122   NITRITE UA  Negative   WBC UA /HPF 0-2   BACTERIA UA /HPF None Seen   SQUAM EPITHEL UA /HPF 0-2     No results found for: BLOODCXNo results found for: RESPCXNo results found for: URINECXNo results found for: WOUNDCXNo results found for:  BODYFLDCXNo results found for: STOOLCX  I have personally looked at the labs and they are summarized above.  ----------------------------------------------------------------------------------------------------------------------  Imaging Results (Last 24 Hours)     ** No results found for the last 24 hours. **        I have personally reviewed the radiology images and read the final radiology report.    Assessment & Plan      Assessment:  Benzodiazepine withdrawal with delirium with H/O Severe benzodiazepine use disorder with xanax  Sinus bradycardia secondary to Precedex gtt  Opioid use disorder, severe, dependence with percocet and suboxone      Plan:  Benzodiazepine withdrawal with delirium with H/O Severe benzodiazepine use disorder and alcohol abuse, improved significantly. Weaned off the precedex gtt. on librium. On CIWA protocol and the MVT, folate and thiamine. UDS +ve for benzodiazepine, suboxone and oxycodone. Consult psychiatry for the transfer back to [facility] for further rehab since patient has improved and does not require precedex gtt anymore.     Sinus bradycardia secondary to Precedex gtt, resolved.     Opioid use disorder, severe, dependence.    Lovenox sc for the DVT prophylaxis.   Po pepcid for the GI prophylaxis.     Management and the plans discussed in detail with the patient and nursing.       The patient is high risk due to the following diagnoses/reasons:  Benzodiazepine withdrawal with delirium with H/O Severe benzodiazepine use disorder with xanax    Disposition [facility]    Manisha Manley MD  02/17/23  21:14 EST

## 2023-02-18 NOTE — DISCHARGE SUMMARY
[facility] HOSPITALISTS DISCHARGE SUMMARY    Patient Identification:  Name:  Margarito Wright  Age:  52 y.o.  Sex:  male  :  1970  MRN:  [id number]  Visit Number:  [id number]    Date of Admission: 2/15/2023  Date of Discharge:  2023     PCP: Provider, No Known      DISCHARGE DIAGNOSIS  Benzodiazepine withdrawal with delirium with H/O Severe benzodiazepine use disorder with xanax- improved signficantly  Sinus bradycardia secondary to Precedex gtt  Opioid use disorder, severe, dependence with percocet and suboxone      CONSULTS   Psychiatry      PROCEDURES PERFORMED  None    HOSPITAL COURSE  Mr. Wright is a 52 y.o. male with the past medical history significant for H/O Severe benzodiazepine use disorder with xanax, Opioid use disorder, severe, dependence with percocet and suboxone presented to [facility] complaining of detox.  Please see the admitting history and physical for further details. Initially admitted to Detox unit, [facility] and received the treatment. However he was requiring excessive amounts of benzodiazepine and remains agitated, hallucinating, so concerned that he will decompensate due to excessive amount of medication required to keep his symptoms under control at the detox unit and therefore transferred to PCU for ongoing care. Started on the precedex gtt. Weaned off and started on librium and on CIWA protocol and the MVT, folate and thiamine. Patient improved significantly, alert, awake and oriented, ambulating. Psychiatry consulted for the transfer back to the [facility] for further drug rehab. This was discussed with the patient and he absolutely refused to be seen by the psychiatrist and refused to get transferred to the [facility]. He was explained in detail regarding worsening withdrawal symptoms and the risk of relapse but he did not agree to either stay or get transferred to [facility]. He signed AMA papers and left against medical  advice.         VITAL SIGNS:  Temp:  [97.4 °F (36.3 °C)-98.7 °F (37.1 °C)] 97.8 °F (36.6 °C)  Heart Rate:  [67-97] 77  Resp:  [12-24] 13  BP: (103-144)/(53-79) 144/53  SpO2:  [97 %-99 %] 99 %  on   ;   Device (Oxygen Therapy): room air    Body mass index is 25.91 kg/m².  Wt Readings from Last 3 Encounters:   02/17/23 81.9 kg (180 lb 8.9 oz)   02/12/23 80.7 kg (178 lb)   02/12/23 78 kg (172 lb)       PHYSICAL EXAM:  HENT:  Head:  Normocephalic and atraumatic.  Mouth:  Moist mucous membranes.    Eyes:  Conjunctivae and EOM are normal.  Pupils are equal, round, and reactive to light.  No scleral icterus.    Neck:  Neck supple.  No JVD present.    Cardiovascular:  normal rate, regular rhythm with no murmur.  Pulmonary/Chest:  No respiratory distress, no wheezes, no crackles, with normal breath sounds and good air movement.  Abdomen:  Soft.  Bowel sounds are normal.  No distension and no tenderness.   Musculoskeletal:  no tenderness, and no deformity.  No red or swollen joints anywhere.    Neurological: Alert awake oriented*4, no focal neurological deficit.  No facial droop.  No slurring of speech  Skin:  Skin is warm and dry. No rash noted. No pallor.   Peripheral vascular:  pulses in all 4 extremities with no clubbing, no cyanosis, no edema.  Genitourinary: no delarosa      DISCHARGE DISPOSITION   Left Against Medical Advice         TEST  RESULTS PENDING AT DISCHARGE       CODE STATUS  Code Status and Medical Interventions:   Ordered at: 02/15/23 1802     Code Status (Patient has no pulse and is not breathing):    CPR (Attempt to Resuscitate)     Medical Interventions (Patient has pulse or is breathing):    Full Support       The ASCVD Risk score (Estela DK, et al., 2019) failed to calculate for the following reasons:    Cannot find a previous HDL lab    Cannot find a previous total cholesterol lab     Manisha Manley MD  02/17/23  21:04 EST        Please send a copy of this dictation to the following providers:   Provider, No Known

## 2023-03-01 NOTE — DISCHARGE SUMMARY
:  1970  MRN:  [id number]  Visit Number:  [id number]      Date of Admission:2023   Date of Discharge:  2/15/2023    Discharge Diagnosis:  Principal Problem:    Severe benzodiazepine use disorder (HCC)  Active Problems:    Opioid use disorder, severe, dependence (HCC)    Benzodiazepine withdrawal with delirium (HCC)        Admission Diagnosis:  Polysubstance abuse (HCC) [F19.10]     HPI  Margarito Wright is a 52 y.o. male who was admitted on 2023 with complaints of benzo and opioid use and withdrawals.  For details please see H&P dated 23.    Hospital Course  Patient is a 52 y.o. male presented with benzo and opioid use and withdrawals. The patient was started on Ativan detox but his confusion worsened and he developed benzo withdrawal delirium despite increasing the frequency of lorazepam and at that point hospital consult was requested and the patient was then transferred to PCU for a higher level of care. .      Mental Status Exam upon discharge:   The patient was restless, confused, agitated and disoriented.     Procedures Performed         Consults:   Consults     No orders found from 2023 to 2023.          Pertinent Test Results:   Admission on 02/15/2023, Discharged on 2023   Component Date Value Ref Range Status   • WBC 2023 5.42  3.40 - 10.80 10*3/mm3 Final   • RBC 2023 4.41  4.14 - 5.80 10*6/mm3 Final   • Hemoglobin 2023 13.4  13.0 - 17.7 g/dL Final   • Hematocrit 2023 40.5  37.5 - 51.0 % Final   • MCV 2023 91.8  79.0 - 97.0 fL Final   • MCH 2023 30.4  26.6 - 33.0 pg Final   • MCHC 2023 33.1  31.5 - 35.7 g/dL Final   • RDW 2023 13.0  12.3 - 15.4 % Final   • RDW-SD 2023 43.7  37.0 - 54.0 fl Final   • MPV 2023 8.4  6.0 - 12.0 fL Final   • Platelets 2023 242  140 - 450 10*3/mm3 Final   • Neutrophil % 2023 46.6  42.7 - 76.0 % Final   • Lymphocyte % 2023 40.0  19.6 - 45.3 % Final   • Monocyte %  02/16/2023 9.8  5.0 - 12.0 % Final   • Eosinophil % 02/16/2023 2.8  0.3 - 6.2 % Final   • Basophil % 02/16/2023 0.6  0.0 - 1.5 % Final   • Immature Grans % 02/16/2023 0.2  0.0 - 0.5 % Final   • Neutrophils, Absolute 02/16/2023 2.53  1.70 - 7.00 10*3/mm3 Final   • Lymphocytes, Absolute 02/16/2023 2.17  0.70 - 3.10 10*3/mm3 Final   • Monocytes, Absolute 02/16/2023 0.53  0.10 - 0.90 10*3/mm3 Final   • Eosinophils, Absolute 02/16/2023 0.15  0.00 - 0.40 10*3/mm3 Final   • Basophils, Absolute 02/16/2023 0.03  0.00 - 0.20 10*3/mm3 Final   • Immature Grans, Absolute 02/16/2023 0.01  0.00 - 0.05 10*3/mm3 Final   • nRBC 02/16/2023 0.0  0.0 - 0.2 /100 WBC Final   • Glucose 02/16/2023 117 (H)  65 - 99 mg/dL Final   • BUN 02/16/2023 12  6 - 20 mg/dL Final   • Creatinine 02/16/2023 0.93  0.76 - 1.27 mg/dL Final   • Sodium 02/16/2023 141  136 - 145 mmol/L Final   • Potassium 02/16/2023 4.7  3.5 - 5.2 mmol/L Final   • Chloride 02/16/2023 108 (H)  98 - 107 mmol/L Final   • CO2 02/16/2023 23.4  22.0 - 29.0 mmol/L Final   • Calcium 02/16/2023 8.6  8.6 - 10.5 mg/dL Final   • Total Protein 02/16/2023 7.5  6.0 - 8.5 g/dL Final   • Albumin 02/16/2023 3.8  3.5 - 5.2 g/dL Final   • ALT (SGPT) 02/16/2023 27  1 - 41 U/L Final   • AST (SGOT) 02/16/2023 37  1 - 40 U/L Final   • Alkaline Phosphatase 02/16/2023 76  39 - 117 U/L Final   • Total Bilirubin 02/16/2023 0.4  0.0 - 1.2 mg/dL Final   • Globulin 02/16/2023 3.7  gm/dL Final   • A/G Ratio 02/16/2023 1.0  g/dL Final   • BUN/Creatinine Ratio 02/16/2023 12.9  7.0 - 25.0 Final   • Anion Gap 02/16/2023 9.6  5.0 - 15.0 mmol/L Final   • eGFR 02/16/2023 98.8  >60.0 mL/min/1.73 Final   • TSH 02/16/2023 3.110  0.270 - 4.200 uIU/mL Final   • Glucose 02/17/2023 123 (H)  65 - 99 mg/dL Final   • BUN 02/17/2023 16  6 - 20 mg/dL Final   • Creatinine 02/17/2023 1.15  0.76 - 1.27 mg/dL Final   • Sodium 02/17/2023 137  136 - 145 mmol/L Final   • Potassium 02/17/2023 3.8  3.5 - 5.2 mmol/L Final   • Chloride  02/17/2023 103  98 - 107 mmol/L Final   • CO2 02/17/2023 21.5 (L)  22.0 - 29.0 mmol/L Final   • Calcium 02/17/2023 9.0  8.6 - 10.5 mg/dL Final   • BUN/Creatinine Ratio 02/17/2023 13.9  7.0 - 25.0 Final   • Anion Gap 02/17/2023 12.5  5.0 - 15.0 mmol/L Final   • eGFR 02/17/2023 76.6  >60.0 mL/min/1.73 Final   • WBC 02/17/2023 7.14  3.40 - 10.80 10*3/mm3 Final   • RBC 02/17/2023 4.60  4.14 - 5.80 10*6/mm3 Final   • Hemoglobin 02/17/2023 13.8  13.0 - 17.7 g/dL Final   • Hematocrit 02/17/2023 41.1  37.5 - 51.0 % Final   • MCV 02/17/2023 89.3  79.0 - 97.0 fL Final   • MCH 02/17/2023 30.0  26.6 - 33.0 pg Final   • MCHC 02/17/2023 33.6  31.5 - 35.7 g/dL Final   • RDW 02/17/2023 12.8  12.3 - 15.4 % Final   • RDW-SD 02/17/2023 42.3  37.0 - 54.0 fl Final   • MPV 02/17/2023 8.6  6.0 - 12.0 fL Final   • Platelets 02/17/2023 247  140 - 450 10*3/mm3 Final   Admission on 02/12/2023, Discharged on 02/15/2023   Component Date Value Ref Range Status   • QT Interval 02/12/2023 430  ms Final   • QTC Interval 02/12/2023 392  ms Final   Admission on 02/12/2023, Discharged on 02/12/2023   Component Date Value Ref Range Status   • Glucose 02/12/2023 133 (H)  65 - 99 mg/dL Final   • BUN 02/12/2023 11  6 - 20 mg/dL Final   • Creatinine 02/12/2023 0.91  0.76 - 1.27 mg/dL Final   • Sodium 02/12/2023 140  136 - 145 mmol/L Final   • Potassium 02/12/2023 3.8  3.5 - 5.2 mmol/L Final   • Chloride 02/12/2023 105  98 - 107 mmol/L Final   • CO2 02/12/2023 26.9  22.0 - 29.0 mmol/L Final   • Calcium 02/12/2023 9.5  8.6 - 10.5 mg/dL Final   • Total Protein 02/12/2023 8.2  6.0 - 8.5 g/dL Final   • Albumin 02/12/2023 4.0  3.5 - 5.2 g/dL Final   • ALT (SGPT) 02/12/2023 35  1 - 41 U/L Final   • AST (SGOT) 02/12/2023 30  1 - 40 U/L Final   • Alkaline Phosphatase 02/12/2023 96  39 - 117 U/L Final   • Total Bilirubin 02/12/2023 0.3  0.0 - 1.2 mg/dL Final   • Globulin 02/12/2023 4.2  gm/dL Final   • A/G Ratio 02/12/2023 1.0  g/dL Final   • BUN/Creatinine Ratio  02/12/2023 12.1  7.0 - 25.0 Final   • Anion Gap 02/12/2023 8.1  5.0 - 15.0 mmol/L Final   • eGFR 02/12/2023 101.4  >60.0 mL/min/1.73 Final   • Color, UA 02/12/2023 Yellow  Yellow, Straw Final   • Appearance, UA 02/12/2023 Clear  Clear Final   • pH, UA 02/12/2023 6.0  5.0 - 8.0 Final   • Specific Gravity, UA 02/12/2023 1.025  1.005 - 1.030 Final   • Glucose, UA 02/12/2023 Negative  Negative Final   • Ketones, UA 02/12/2023 Negative  Negative Final   • Bilirubin, UA 02/12/2023 Negative  Negative Final   • Blood, UA 02/12/2023 Trace (A)  Negative Final   • Protein, UA 02/12/2023 Trace (A)  Negative Final   • Leuk Esterase, UA 02/12/2023 Negative  Negative Final   • Nitrite, UA 02/12/2023 Negative  Negative Final   • Urobilinogen, UA 02/12/2023 1.0 E.U./dL  0.2 - 1.0 E.U./dL Final   • Ethanol 02/12/2023 <10  0 - 10 mg/dL Final   • Ethanol % 02/12/2023 <0.010  % Final   • THC, Screen, Urine 02/12/2023 Negative  Negative Final   • Phencyclidine (PCP), Urine 02/12/2023 Negative  Negative Final   • Cocaine Screen, Urine 02/12/2023 Negative  Negative Final   • Methamphetamine, Ur 02/12/2023 Negative  Negative Final   • Opiate Screen 02/12/2023 Negative  Negative Final   • Amphetamine Screen, Urine 02/12/2023 Negative  Negative Final   • Benzodiazepine Screen, Urine 02/12/2023 Positive (A)  Negative Final   • Tricyclic Antidepressants Screen 02/12/2023 Negative  Negative Final   • Methadone Screen, Urine 02/12/2023 Negative  Negative Final   • Barbiturates Screen, Urine 02/12/2023 Negative  Negative Final   • Oxycodone Screen, Urine 02/12/2023 Positive (A)  Negative Final   • Propoxyphene Screen 02/12/2023 Negative  Negative Final   • Buprenorphine, Screen, Urine 02/12/2023 Positive (A)  Negative Final   • Magnesium 02/12/2023 2.1  1.6 - 2.6 mg/dL Final   • COVID19 02/12/2023 Not Detected  Not Detected - Ref. Range Final   • Influenza A PCR 02/12/2023 Not Detected  Not Detected Final   • Influenza B PCR 02/12/2023 Not  Detected  Not Detected Final   • WBC 02/12/2023 7.42  3.40 - 10.80 10*3/mm3 Final   • RBC 02/12/2023 4.86  4.14 - 5.80 10*6/mm3 Final   • Hemoglobin 02/12/2023 14.2  13.0 - 17.7 g/dL Final   • Hematocrit 02/12/2023 43.2  37.5 - 51.0 % Final   • MCV 02/12/2023 88.9  79.0 - 97.0 fL Final   • MCH 02/12/2023 29.2  26.6 - 33.0 pg Final   • MCHC 02/12/2023 32.9  31.5 - 35.7 g/dL Final   • RDW 02/12/2023 13.1  12.3 - 15.4 % Final   • RDW-SD 02/12/2023 42.6  37.0 - 54.0 fl Final   • MPV 02/12/2023 8.6  6.0 - 12.0 fL Final   • Platelets 02/12/2023 262  140 - 450 10*3/mm3 Final   • Neutrophil % 02/12/2023 65.8  42.7 - 76.0 % Final   • Lymphocyte % 02/12/2023 22.6  19.6 - 45.3 % Final   • Monocyte % 02/12/2023 9.8  5.0 - 12.0 % Final   • Eosinophil % 02/12/2023 1.3  0.3 - 6.2 % Final   • Basophil % 02/12/2023 0.4  0.0 - 1.5 % Final   • Immature Grans % 02/12/2023 0.1  0.0 - 0.5 % Final   • Neutrophils, Absolute 02/12/2023 4.87  1.70 - 7.00 10*3/mm3 Final   • Lymphocytes, Absolute 02/12/2023 1.68  0.70 - 3.10 10*3/mm3 Final   • Monocytes, Absolute 02/12/2023 0.73  0.10 - 0.90 10*3/mm3 Final   • Eosinophils, Absolute 02/12/2023 0.10  0.00 - 0.40 10*3/mm3 Final   • Basophils, Absolute 02/12/2023 0.03  0.00 - 0.20 10*3/mm3 Final   • Immature Grans, Absolute 02/12/2023 0.01  0.00 - 0.05 10*3/mm3 Final   • nRBC 02/12/2023 0.0  0.0 - 0.2 /100 WBC Final   • Extra Tube 02/12/2023 Hold for add-ons.   Final    Auto resulted.   • Extra Tube 02/12/2023 hold for add-on   Final    Auto resulted   • Extra Tube 02/12/2023 Hold for add-ons.   Final    Auto resulted.   • Extra Tube 02/12/2023 Hold for add-ons.   Final    Auto resulted   • RBC, UA 02/12/2023 3-5 (A)  None Seen, 0-2 /HPF Final   • WBC, UA 02/12/2023 0-2  None Seen, 0-2 /HPF Final   • Bacteria, UA 02/12/2023 None Seen  None Seen /HPF Final   • Squamous Epithelial Cells, UA 02/12/2023 0-2  None Seen, 0-2 /HPF Final   • Hyaline Casts, UA 02/12/2023 None Seen  None Seen /LPF Final   •  Methodology 02/12/2023 Automated Microscopy   Final        Condition on Discharge:  guarded    Vital Signs         Discharge Disposition:  Hospice/Medical Facility (DC - External)    Discharge Medications:     Discharge Medications      Patient Not Prescribed Medications Upon Discharge         Discharge Diet:    Diet Instructions    Reg diet           Activity at Discharge:    Activity Instructions    Activity as tolerated            Follow-up Appointments  No future appointments.      Test Results Pending at Discharge      Time spent in discharge: > 30 min    Clinician:   Hayden Arce MD  03/01/23  08:54 EST

## 2023-12-13 ENCOUNTER — TRANSCRIBE ORDERS (OUTPATIENT)
Dept: ADMINISTRATIVE | Facility: HOSPITAL | Age: 53
End: 2023-12-13
Payer: COMMERCIAL

## 2023-12-13 DIAGNOSIS — N50.89 CHYLOCELE OF TUNICA VAGINALIS: Primary | ICD-10-CM
